# Patient Record
Sex: FEMALE | Race: WHITE | NOT HISPANIC OR LATINO
[De-identification: names, ages, dates, MRNs, and addresses within clinical notes are randomized per-mention and may not be internally consistent; named-entity substitution may affect disease eponyms.]

---

## 2017-10-23 ENCOUNTER — RX RENEWAL (OUTPATIENT)
Age: 57
End: 2017-10-23

## 2017-10-27 ENCOUNTER — APPOINTMENT (OUTPATIENT)
Dept: INTERNAL MEDICINE | Facility: CLINIC | Age: 57
End: 2017-10-27
Payer: COMMERCIAL

## 2017-10-27 VITALS
WEIGHT: 143 LBS | TEMPERATURE: 98.6 F | DIASTOLIC BLOOD PRESSURE: 80 MMHG | BODY MASS INDEX: 26.31 KG/M2 | OXYGEN SATURATION: 98 % | HEART RATE: 84 BPM | HEIGHT: 62 IN | RESPIRATION RATE: 18 BRPM | SYSTOLIC BLOOD PRESSURE: 120 MMHG

## 2017-10-27 DIAGNOSIS — H81.10 BENIGN PAROXYSMAL VERTIGO, UNSPECIFIED EAR: ICD-10-CM

## 2017-10-27 PROCEDURE — 99214 OFFICE O/P EST MOD 30 MIN: CPT

## 2017-10-27 RX ORDER — ALBUTEROL SULFATE 90 UG/1
108 (90 BASE) AEROSOL, METERED RESPIRATORY (INHALATION)
Qty: 18 | Refills: 0 | Status: ACTIVE | COMMUNITY
Start: 2017-05-30

## 2017-10-27 RX ORDER — ALBUTEROL SULFATE 108 UG/1
108 (90 BASE) AEROSOL, METERED RESPIRATORY (INHALATION)
Qty: 1 | Refills: 3 | Status: ACTIVE | COMMUNITY
Start: 2017-10-27 | End: 1900-01-01

## 2017-10-30 ENCOUNTER — TRANSCRIPTION ENCOUNTER (OUTPATIENT)
Age: 57
End: 2017-10-30

## 2017-12-22 ENCOUNTER — RX RENEWAL (OUTPATIENT)
Age: 57
End: 2017-12-22

## 2017-12-27 ENCOUNTER — RX RENEWAL (OUTPATIENT)
Age: 57
End: 2017-12-27

## 2018-01-19 ENCOUNTER — MEDICATION RENEWAL (OUTPATIENT)
Age: 58
End: 2018-01-19

## 2018-01-20 ENCOUNTER — RX RENEWAL (OUTPATIENT)
Age: 58
End: 2018-01-20

## 2018-01-23 ENCOUNTER — RX RENEWAL (OUTPATIENT)
Age: 58
End: 2018-01-23

## 2018-02-21 ENCOUNTER — MEDICATION RENEWAL (OUTPATIENT)
Age: 58
End: 2018-02-21

## 2018-03-25 ENCOUNTER — RX RENEWAL (OUTPATIENT)
Age: 58
End: 2018-03-25

## 2018-03-27 ENCOUNTER — MEDICATION RENEWAL (OUTPATIENT)
Age: 58
End: 2018-03-27

## 2018-03-30 ENCOUNTER — RX RENEWAL (OUTPATIENT)
Age: 58
End: 2018-03-30

## 2018-04-28 ENCOUNTER — RX RENEWAL (OUTPATIENT)
Age: 58
End: 2018-04-28

## 2018-05-25 ENCOUNTER — RX RENEWAL (OUTPATIENT)
Age: 58
End: 2018-05-25

## 2018-06-07 ENCOUNTER — APPOINTMENT (OUTPATIENT)
Dept: INTERNAL MEDICINE | Facility: CLINIC | Age: 58
End: 2018-06-07
Payer: COMMERCIAL

## 2018-06-07 VITALS
HEART RATE: 79 BPM | HEIGHT: 62 IN | BODY MASS INDEX: 26.5 KG/M2 | RESPIRATION RATE: 16 BRPM | SYSTOLIC BLOOD PRESSURE: 130 MMHG | WEIGHT: 144 LBS | OXYGEN SATURATION: 98 % | TEMPERATURE: 99.1 F | DIASTOLIC BLOOD PRESSURE: 80 MMHG

## 2018-06-07 DIAGNOSIS — Z87.891 PERSONAL HISTORY OF NICOTINE DEPENDENCE: ICD-10-CM

## 2018-06-07 PROCEDURE — 99214 OFFICE O/P EST MOD 30 MIN: CPT

## 2018-06-07 NOTE — ASSESSMENT
[FreeTextEntry1] : 57 yr old presenys sick visit\par #1 Sinusitis- TX Z- waldo as directed , continue saline rinses, \par #2 Allergic Rhin-  Anti- histamine daily, encouraged Singulair\par #3 Asthma- Continue Advair as directed, encouraged MAYLIN via Neb or Inhaler to prevent  lung reactivity\par F/U 2 weeks if not totally resolved

## 2018-06-07 NOTE — HEALTH RISK ASSESSMENT
[No falls in past year] : Patient reported no falls in the past year [0] : 1) Little interest or pleasure doing things: Not at all (0) [1] : 2) Feeling down, depressed, or hopeless for several days (1) [] : No [[id number]] : 0

## 2018-06-07 NOTE — HISTORY OF PRESENT ILLNESS
[FreeTextEntry1] : Sick visit [de-identified] : 57 yr old presents for post nasal drip, sore throat, felt tired. Reports her mucus is dark green at this time. Pt has known Allergies/ Asthma is taking Advair daily as recommended. Pt not taking Singulair and or Anti- histamine daily as directed. No fever, no chills, cough , mainly in morning upon awakening.

## 2018-06-07 NOTE — PHYSICAL EXAM
[No Acute Distress] : no acute distress [Well Nourished] : well nourished [Well Developed] : well developed [Well-Appearing] : well-appearing [Normal Sclera/Conjunctiva] : normal sclera/conjunctiva [PERRL] : pupils equal round and reactive to light [EOMI] : extraocular movements intact [Normal Outer Ear/Nose] : the outer ears and nose were normal in appearance [Normal Oropharynx] : the oropharynx was normal [No JVD] : no jugular venous distention [Supple] : supple [No Lymphadenopathy] : no lymphadenopathy [Thyroid Normal, No Nodules] : the thyroid was normal and there were no nodules present [No Respiratory Distress] : no respiratory distress  [Clear to Auscultation] : lungs were clear to auscultation bilaterally [No Accessory Muscle Use] : no accessory muscle use [Normal Rate] : normal rate  [Regular Rhythm] : with a regular rhythm [Normal S1, S2] : normal S1 and S2 [No Murmur] : no murmur heard [Normal Posterior Cervical Nodes] : no posterior cervical lymphadenopathy [Normal Anterior Cervical Nodes] : no anterior cervical lymphadenopathy [No CVA Tenderness] : no CVA  tenderness [No Spinal Tenderness] : no spinal tenderness [No Joint Swelling] : no joint swelling [Grossly Normal Strength/Tone] : grossly normal strength/tone [No Rash] : no rash [Normal Gait] : normal gait [Coordination Grossly Intact] : coordination grossly intact [No Focal Deficits] : no focal deficits [Deep Tendon Reflexes (DTR)] : deep tendon reflexes were 2+ and symmetric [Normal Affect] : the affect was normal [Normal Insight/Judgement] : insight and judgment were intact [de-identified] : sounds congested nasally when speaks  [de-identified] : decrease in air exchange

## 2019-01-13 ENCOUNTER — RX RENEWAL (OUTPATIENT)
Age: 59
End: 2019-01-13

## 2019-03-27 ENCOUNTER — APPOINTMENT (OUTPATIENT)
Dept: INTERNAL MEDICINE | Facility: CLINIC | Age: 59
End: 2019-03-27
Payer: COMMERCIAL

## 2019-03-27 VITALS
HEIGHT: 62 IN | SYSTOLIC BLOOD PRESSURE: 140 MMHG | OXYGEN SATURATION: 98 % | RESPIRATION RATE: 16 BRPM | TEMPERATURE: 98.1 F | DIASTOLIC BLOOD PRESSURE: 78 MMHG | WEIGHT: 148 LBS | BODY MASS INDEX: 27.23 KG/M2 | HEART RATE: 80 BPM

## 2019-03-27 PROCEDURE — 99214 OFFICE O/P EST MOD 30 MIN: CPT

## 2019-03-28 NOTE — REVIEW OF SYSTEMS
[Wheezing] : wheezing [Cough] : cough [Muscle Pain] : muscle pain [Anxiety] : anxiety [Fever] : no fever [Chills] : no chills [Night Sweats] : no night sweats [Chest Pain] : no chest pain [Palpitations] : no palpitations [Shortness Of Breath] : no shortness of breath [Dyspnea on Exertion] : no dyspnea on exertion [Abdominal Pain] : no abdominal pain [Nausea] : no nausea [Diarrhea] : diarrhea [Vomiting] : no vomiting [Heartburn] : no heartburn [Dysuria] : no dysuria [Nocturia] : no nocturia [Frequency] : no frequency [Headache] : no headache [Dizziness] : no dizziness [Fainting] : no fainting [FreeTextEntry9] : right lateral chest

## 2019-03-28 NOTE — HEALTH RISK ASSESSMENT
[Intercurrent Urgi Care visits] : went to urgent care [Any fall with injury in past year] : Patient reported fall with injury in the past year [0] : 2) Feeling down, depressed, or hopeless: Not at all (0) [] : No [de-identified] : none [de-identified] : exercises  [de-identified] : Regular

## 2019-03-28 NOTE — PLAN
[FreeTextEntry1] : Continue Motrin as prescribed with intermittent extra strength Tylenol every 6 hrs for break through pain.\par Will start consider adding Lidoderm patch for further pain management. \par Advised to take Motrin with food and to alternate between Motrin and Tylenol administration as needed. \par Advised to increase Advair dosing to twice daily ( was doing once  daily only) Pt resistant to Prednisone ( Medrol Dose Gonzales ) which was suggested, reports side effects are bothersome to her \par Will renew ProAir inhaler for rescue dosing as needed\par F/U 2-3 days if symptoms do not improve and/ or worsen,  If difficulty breathing/ 911 \par F/U Allergist/ Pulmonology

## 2019-03-28 NOTE — HISTORY OF PRESENT ILLNESS
[FreeTextEntry1] : Chest symptoms / Sick  [de-identified] : 58 y.o woman presenting with complaints of difficulty breathing and productive cough since s/p fall on 3/12 and hearing a pop in her right lateral chest wall that occurred on 3/22. She reports that since then she has had increase use of her rescue inhaler. She was seen in urgent care for fall and secondary popping sound. X-Ray performed and were negative. She was prescribed Motrin 600 mg 4 x daily as needed and omeprazole 40 mg. She reports only taking  Motrin 3 times instead. She has not taken her rescue inhaler for the past two days but reports concerns for lung injury or infection from the fall. Patient has PCP/ Dr. Go \philipp \par \par

## 2019-03-28 NOTE — PHYSICAL EXAM
[No Acute Distress] : no acute distress [Normal Outer Ear/Nose] : the outer ears and nose were normal in appearance [Normal Oropharynx] : the oropharynx was normal [Normal TMs] : both tympanic membranes were normal [No Lymphadenopathy] : no lymphadenopathy [No Respiratory Distress] : no respiratory distress  [Clear to Auscultation] : lungs were clear to auscultation bilaterally [No Accessory Muscle Use] : no accessory muscle use [Normal Rate] : normal rate  [Regular Rhythm] : with a regular rhythm [Normal S1, S2] : normal S1 and S2 [Soft] : abdomen soft [Non Tender] : non-tender [No Focal Deficits] : no focal deficits [No Rash] : no rash [Normal Gait] : normal gait [de-identified] : Expiration is shortenned  [de-identified] : right lateral chest wall tenderness with light palpation. +Wearing support brace / Plastic to left lower leg/ ankle  [de-identified] : anxious

## 2019-03-29 ENCOUNTER — TRANSCRIPTION ENCOUNTER (OUTPATIENT)
Age: 59
End: 2019-03-29

## 2019-03-29 ENCOUNTER — MEDICATION RENEWAL (OUTPATIENT)
Age: 59
End: 2019-03-29

## 2019-10-07 ENCOUNTER — RX RENEWAL (OUTPATIENT)
Age: 59
End: 2019-10-07

## 2019-11-08 ENCOUNTER — MEDICATION RENEWAL (OUTPATIENT)
Age: 59
End: 2019-11-08

## 2019-11-08 ENCOUNTER — RX RENEWAL (OUTPATIENT)
Age: 59
End: 2019-11-08

## 2020-01-24 ENCOUNTER — APPOINTMENT (OUTPATIENT)
Dept: INTERNAL MEDICINE | Facility: CLINIC | Age: 60
End: 2020-01-24
Payer: COMMERCIAL

## 2020-01-24 VITALS
OXYGEN SATURATION: 98 % | BODY MASS INDEX: 27.6 KG/M2 | RESPIRATION RATE: 16 BRPM | WEIGHT: 150 LBS | SYSTOLIC BLOOD PRESSURE: 130 MMHG | HEART RATE: 82 BPM | DIASTOLIC BLOOD PRESSURE: 80 MMHG | HEIGHT: 62 IN | TEMPERATURE: 98.2 F

## 2020-01-24 PROCEDURE — 99213 OFFICE O/P EST LOW 20 MIN: CPT | Mod: 25

## 2020-01-24 PROCEDURE — G0009: CPT

## 2020-01-24 PROCEDURE — 90670 PCV13 VACCINE IM: CPT

## 2020-01-24 NOTE — HISTORY OF PRESENT ILLNESS
[FreeTextEntry1] : F/U  [de-identified] : 59 yr old presents was seen by ENT, started on Prednisone and Doxycycline  for Sinusitis , feels better overall. Continues to use Maintenance Inhaler and rescue Inhaler for Asthma/ Allergies. Upset about gaining weight. Asking about pneumonia Vaccine.

## 2020-01-24 NOTE — HEALTH RISK ASSESSMENT
[1 or 2 (0 pts)] : 1 or 2 (0 points) [Never (0 pts)] : Never (0 points) [No] : In the past 12 months have you used drugs other than those required for medical reasons? No [No falls in past year] : Patient reported no falls in the past year [0] : 2) Feeling down, depressed, or hopeless: Not at all (0) [] : No [de-identified] : ENT [de-identified] : none [Audit-CScore] : 0 [[id number]] : 0

## 2020-01-24 NOTE — HEALTH RISK ASSESSMENT
[1 or 2 (0 pts)] : 1 or 2 (0 points) [Never (0 pts)] : Never (0 points) [No] : In the past 12 months have you used drugs other than those required for medical reasons? No [No falls in past year] : Patient reported no falls in the past year [0] : 2) Feeling down, depressed, or hopeless: Not at all (0) [] : No [de-identified] : none [de-identified] : ENT [Audit-CScore] : 0 [[id number]] : 0

## 2020-01-24 NOTE — ASSESSMENT
[FreeTextEntry1] : 59 yr old presents F/U \par \par #1 Asthma- Fairly good control, continue all maintenance meds , rescue Inhaler if needed only, would like to go back on Advair in future if possible thinks it works better for her. \par \par #2 Allergic Rhinitis- Anti- histamione if needed only, nasal spray daily\par \par PCV #13 given today, F/U PCP ( need labs from PCP)

## 2020-01-24 NOTE — HISTORY OF PRESENT ILLNESS
[FreeTextEntry1] : F/U  [de-identified] : 59 yr old presents was seen by ENT, started on Prednisone and Doxycycline  for Sinusitis , feels better overall. Continues to use Maintenance Inhaler and rescue Inhaler for Asthma/ Allergies. Upset about gaining weight. Asking about pneumonia Vaccine.

## 2020-01-24 NOTE — PHYSICAL EXAM
[Well Nourished] : well nourished [No Acute Distress] : no acute distress [Well-Appearing] : well-appearing [Well Developed] : well developed [Normal Sclera/Conjunctiva] : normal sclera/conjunctiva [Normal Oropharynx] : the oropharynx was normal [Normal Outer Ear/Nose] : the outer ears and nose were normal in appearance [No JVD] : no jugular venous distention [Normal TMs] : both tympanic membranes were normal [No Lymphadenopathy] : no lymphadenopathy [No Respiratory Distress] : no respiratory distress  [No Accessory Muscle Use] : no accessory muscle use [Normal Rate] : normal rate  [Clear to Auscultation] : lungs were clear to auscultation bilaterally [Normal Anterior Cervical Nodes] : no anterior cervical lymphadenopathy [No CVA Tenderness] : no CVA  tenderness [No Rash] : no rash [Grossly Normal Strength/Tone] : grossly normal strength/tone [No Joint Swelling] : no joint swelling [Coordination Grossly Intact] : coordination grossly intact [No Skin Lesions] : no skin lesions [Speech Grossly Normal] : speech grossly normal [Memory Grossly Normal] : memory grossly normal [Normal Affect] : the affect was normal [Alert and Oriented x3] : oriented to person, place, and time [Normal Mood] : the mood was normal [de-identified] : Horace cerumen ears  [Normal Insight/Judgement] : insight and judgment were intact

## 2020-01-24 NOTE — HISTORY OF PRESENT ILLNESS
[FreeTextEntry1] : F/U  [de-identified] : 59 yr old presents was seen by ENT, started on Prednisone and Doxycycline  for Sinusitis , feels better overall. Continues to use Maintenance Inhaler and rescue Inhaler for Asthma/ Allergies. Upset about gaining weight. Asking about pneumonia Vaccine.

## 2020-01-24 NOTE — PHYSICAL EXAM
[Well Nourished] : well nourished [No Acute Distress] : no acute distress [Well-Appearing] : well-appearing [Well Developed] : well developed [Normal Outer Ear/Nose] : the outer ears and nose were normal in appearance [Normal Sclera/Conjunctiva] : normal sclera/conjunctiva [Normal Oropharynx] : the oropharynx was normal [No JVD] : no jugular venous distention [Normal TMs] : both tympanic membranes were normal [No Lymphadenopathy] : no lymphadenopathy [No Respiratory Distress] : no respiratory distress  [No Accessory Muscle Use] : no accessory muscle use [Clear to Auscultation] : lungs were clear to auscultation bilaterally [Normal Rate] : normal rate  [No CVA Tenderness] : no CVA  tenderness [Normal Anterior Cervical Nodes] : no anterior cervical lymphadenopathy [Grossly Normal Strength/Tone] : grossly normal strength/tone [No Rash] : no rash [No Joint Swelling] : no joint swelling [Coordination Grossly Intact] : coordination grossly intact [No Skin Lesions] : no skin lesions [Memory Grossly Normal] : memory grossly normal [Speech Grossly Normal] : speech grossly normal [Normal Affect] : the affect was normal [Normal Mood] : the mood was normal [Alert and Oriented x3] : oriented to person, place, and time [de-identified] : Horace cerumen ears  [Normal Insight/Judgement] : insight and judgment were intact

## 2020-01-24 NOTE — PHYSICAL EXAM
[No Acute Distress] : no acute distress [Well Nourished] : well nourished [Well-Appearing] : well-appearing [Well Developed] : well developed [Normal Sclera/Conjunctiva] : normal sclera/conjunctiva [Normal Oropharynx] : the oropharynx was normal [Normal Outer Ear/Nose] : the outer ears and nose were normal in appearance [Normal TMs] : both tympanic membranes were normal [No JVD] : no jugular venous distention [No Lymphadenopathy] : no lymphadenopathy [No Respiratory Distress] : no respiratory distress  [No Accessory Muscle Use] : no accessory muscle use [Normal Rate] : normal rate  [Clear to Auscultation] : lungs were clear to auscultation bilaterally [No CVA Tenderness] : no CVA  tenderness [Normal Anterior Cervical Nodes] : no anterior cervical lymphadenopathy [No Rash] : no rash [Grossly Normal Strength/Tone] : grossly normal strength/tone [No Joint Swelling] : no joint swelling [Coordination Grossly Intact] : coordination grossly intact [No Skin Lesions] : no skin lesions [Speech Grossly Normal] : speech grossly normal [Normal Affect] : the affect was normal [Memory Grossly Normal] : memory grossly normal [Normal Mood] : the mood was normal [Alert and Oriented x3] : oriented to person, place, and time [de-identified] : Horace cerumen ears  [Normal Insight/Judgement] : insight and judgment were intact

## 2020-01-24 NOTE — HEALTH RISK ASSESSMENT
[1 or 2 (0 pts)] : 1 or 2 (0 points) [Never (0 pts)] : Never (0 points) [No] : In the past 12 months have you used drugs other than those required for medical reasons? No [No falls in past year] : Patient reported no falls in the past year [0] : 2) Feeling down, depressed, or hopeless: Not at all (0) [] : No [de-identified] : ENT [de-identified] : none [[id number]] : 0 [Audit-CScore] : 0

## 2020-02-17 ENCOUNTER — APPOINTMENT (OUTPATIENT)
Dept: INTERNAL MEDICINE | Facility: CLINIC | Age: 60
End: 2020-02-17
Payer: COMMERCIAL

## 2020-02-17 VITALS
BODY MASS INDEX: 27.79 KG/M2 | DIASTOLIC BLOOD PRESSURE: 80 MMHG | WEIGHT: 151 LBS | HEART RATE: 72 BPM | SYSTOLIC BLOOD PRESSURE: 120 MMHG | OXYGEN SATURATION: 98 % | HEIGHT: 62 IN | RESPIRATION RATE: 12 BRPM

## 2020-02-17 VITALS
BODY MASS INDEX: 27.79 KG/M2 | HEART RATE: 72 BPM | WEIGHT: 151 LBS | TEMPERATURE: 98.4 F | HEIGHT: 62 IN | OXYGEN SATURATION: 98 %

## 2020-02-17 DIAGNOSIS — C50.919 MALIGNANT NEOPLASM OF UNSPECIFIED SITE OF UNSPECIFIED FEMALE BREAST: ICD-10-CM

## 2020-02-17 DIAGNOSIS — Z87.09 PERSONAL HISTORY OF OTHER DISEASES OF THE RESPIRATORY SYSTEM: ICD-10-CM

## 2020-02-17 DIAGNOSIS — R07.81 PLEURODYNIA: ICD-10-CM

## 2020-02-17 DIAGNOSIS — Z23 ENCOUNTER FOR IMMUNIZATION: ICD-10-CM

## 2020-02-17 DIAGNOSIS — J45.901 UNSPECIFIED ASTHMA WITH (ACUTE) EXACERBATION: ICD-10-CM

## 2020-02-17 PROCEDURE — 99203 OFFICE O/P NEW LOW 30 MIN: CPT | Mod: 25

## 2020-02-17 PROCEDURE — 94010 BREATHING CAPACITY TEST: CPT

## 2020-02-17 RX ORDER — LIDOCAINE 5% 700 MG/1
5 PATCH TOPICAL
Qty: 30 | Refills: 1 | Status: DISCONTINUED | COMMUNITY
Start: 2019-03-27 | End: 2020-02-17

## 2020-02-17 RX ORDER — HYDROCHLOROTHIAZIDE 12.5 MG/1
12.5 CAPSULE ORAL DAILY
Qty: 30 | Refills: 0 | Status: DISCONTINUED | COMMUNITY
End: 2020-02-17

## 2020-02-17 RX ORDER — FLUTICASONE PROPIONATE AND SALMETEROL 50; 500 UG/1; UG/1
500-50 POWDER RESPIRATORY (INHALATION)
Qty: 1 | Refills: 3 | Status: DISCONTINUED | COMMUNITY
Start: 2018-01-20 | End: 2020-02-17

## 2020-02-17 RX ORDER — MECLIZINE HYDROCHLORIDE 25 MG/1
25 TABLET ORAL 3 TIMES DAILY
Qty: 45 | Refills: 1 | Status: DISCONTINUED | COMMUNITY
Start: 2017-10-27 | End: 2020-02-17

## 2020-02-17 NOTE — REASON FOR VISIT
[Asthma] : asthma [Initial Consultation] : an initial consultation for [Wheezing] : wheezing [Cough] : cough

## 2020-02-17 NOTE — SOCIAL HISTORY
[House] : [unfilled] lives in a house  [Dry] : dry [Radiator/Baseboard] : heating provided by radiator(s)/baseboard(s) [Cat] : cat [Dust Mite Covers] : does not have dust mite covers [Feather Comforter] : does not have a feather comforter [Bedroom] : not in the bedroom [Basement] : not in the basement [Feather Pillows] : does not have feather pillows [Living Area] : not in the living area

## 2020-02-17 NOTE — HISTORY OF PRESENT ILLNESS
[Dyspnea on Exertion] : dyspnea on exertion [Cough] : cough [0 x/month] : 0 x/month [> than 2 exac/6months] : > than 2 exac/6months [< or = 2 days/wk] : < than or = 2 days/week [Minor Limitation] : minor limitation [Chest Pain] : no chest pain [Sputum Production] : non productive cough [Shortness of Breath] : no shortness of breath [Wheezing] : no wheezing

## 2020-02-17 NOTE — PHYSICAL EXAM
[Well Nourished] : well nourished [Normal Rate and Effort] : normal respiratory rhythm and effort [Bilateral Audible Breath Sounds] : bilateral audible breath sounds [Normal S1, S2] : normal S1 and S2 [No murmur] : no murmur [Conjunctival Erythema] : no conjunctival erythema [Pharyngeal erythema] : no pharyngeal erythema [Boggy Nasal Turbinates] : no boggy and/or pale nasal turbinates [Suborbital Bogginess] : no suborbital bogginess (allergic shiners) [Exudate] : no exudate [Clear Rhinorrhea] : no clear rhinorrhea was seen [Posterior Pharyngeal Cobblestoning] : no posterior pharyngeal cobblestoning [Wheezing] : no wheezing was heard

## 2020-03-19 ENCOUNTER — APPOINTMENT (OUTPATIENT)
Dept: INTERNAL MEDICINE | Facility: CLINIC | Age: 60
End: 2020-03-19
Payer: COMMERCIAL

## 2020-03-19 VITALS
DIASTOLIC BLOOD PRESSURE: 70 MMHG | SYSTOLIC BLOOD PRESSURE: 130 MMHG | TEMPERATURE: 98.4 F | BODY MASS INDEX: 27.6 KG/M2 | HEART RATE: 98 BPM | HEIGHT: 62 IN | OXYGEN SATURATION: 98 % | WEIGHT: 150 LBS

## 2020-03-19 DIAGNOSIS — M94.0 CHONDROCOSTAL JUNCTION SYNDROME [TIETZE]: ICD-10-CM

## 2020-03-19 PROCEDURE — 99213 OFFICE O/P EST LOW 20 MIN: CPT

## 2020-03-19 NOTE — HISTORY OF PRESENT ILLNESS
[FreeTextEntry8] : The pt presents with C/O some exacerbation of her asthma symptoms\par Has minimal phlem\par Temp are not elevated\par Is using her ICS/LABA once daily\par Is exposed to the general public at her work\par

## 2020-03-19 NOTE — PHYSICAL EXAM
[Well Nourished] : well nourished [Normal Oropharynx] : the oropharynx was normal [Supple] : supple [Normal Rate] : normal rate  [Regular Rhythm] : with a regular rhythm [Normal S1, S2] : normal S1 and S2 [No Lymphadenopathy] : no lymphadenopathy [Clear to Auscultation] : lungs were clear to auscultation bilaterally

## 2020-03-19 NOTE — HEALTH RISK ASSESSMENT
[No] : In the past 12 months have you used drugs other than those required for medical reasons? No [No falls in past year] : Patient reported no falls in the past year [0] : 2) Feeling down, depressed, or hopeless: Not at all (0) [] : No [de-identified] : ENT [Audit-CScore] : 0 [[id number]] : 0

## 2020-03-30 ENCOUNTER — TRANSCRIPTION ENCOUNTER (OUTPATIENT)
Age: 60
End: 2020-03-30

## 2020-06-10 ENCOUNTER — TRANSCRIPTION ENCOUNTER (OUTPATIENT)
Age: 60
End: 2020-06-10

## 2020-07-16 ENCOUNTER — TRANSCRIPTION ENCOUNTER (OUTPATIENT)
Age: 60
End: 2020-07-16

## 2020-07-29 ENCOUNTER — APPOINTMENT (OUTPATIENT)
Dept: INTERNAL MEDICINE | Facility: CLINIC | Age: 60
End: 2020-07-29

## 2020-07-29 VITALS
TEMPERATURE: 97.6 F | WEIGHT: 151 LBS | BODY MASS INDEX: 27.79 KG/M2 | OXYGEN SATURATION: 98 % | HEIGHT: 62 IN | HEART RATE: 85 BPM

## 2020-08-04 ENCOUNTER — APPOINTMENT (OUTPATIENT)
Dept: INTERNAL MEDICINE | Facility: CLINIC | Age: 60
End: 2020-08-04
Payer: COMMERCIAL

## 2020-08-04 VITALS
SYSTOLIC BLOOD PRESSURE: 132 MMHG | OXYGEN SATURATION: 98 % | HEART RATE: 75 BPM | RESPIRATION RATE: 16 BRPM | BODY MASS INDEX: 27.6 KG/M2 | DIASTOLIC BLOOD PRESSURE: 80 MMHG | TEMPERATURE: 98.1 F | WEIGHT: 150 LBS | HEIGHT: 62 IN

## 2020-08-04 PROCEDURE — 36415 COLL VENOUS BLD VENIPUNCTURE: CPT

## 2020-08-04 PROCEDURE — 99213 OFFICE O/P EST LOW 20 MIN: CPT | Mod: 25

## 2020-08-05 NOTE — HEALTH RISK ASSESSMENT
[No] : In the past 12 months have you used drugs other than those required for medical reasons? No [No falls in past year] : Patient reported no falls in the past year [0] : 2) Feeling down, depressed, or hopeless: Not at all (0) [] : No [de-identified] : ENT -Dr. Lees [de-identified] : none [Audit-CScore] : 0 [de-identified] : Walking, Stationary bike [de-identified] : Normal diet. [[id number]] : 0

## 2020-08-05 NOTE — ASSESSMENT
[FreeTextEntry1] : 59 yr old new PCP\par \par #1 Asthma- Controlled ? F/U Pulmonary for Consult, continue maintenance Inhaler\par \par #2 Allergic Rhinitis- Keep under control with all meds, N/S to Nares Bilaterally \par \par #3 Anxiety- Renew Klonopin , F/U Counseling ( Cant tolerate SSRI's according to Patient) \par \par #4 HTN- Controlled, low salt diet, meds daily\par \par Labs done in office by MA, will call with results\par \par F/U 3-4 months , needs CPE\par

## 2020-08-05 NOTE — PHYSICAL EXAM
[No Acute Distress] : no acute distress [Well-Appearing] : well-appearing [Well Nourished] : well nourished [Well Developed] : well developed [Normal Oropharynx] : the oropharynx was normal [Normal Sclera/Conjunctiva] : normal sclera/conjunctiva [No JVD] : no jugular venous distention [Normal Outer Ear/Nose] : the outer ears and nose were normal in appearance [No Respiratory Distress] : no respiratory distress  [No Accessory Muscle Use] : no accessory muscle use [No Lymphadenopathy] : no lymphadenopathy [Normal Rate] : normal rate  [Clear to Auscultation] : lungs were clear to auscultation bilaterally [No Edema] : there was no peripheral edema [No Rash] : no rash [No CVA Tenderness] : no CVA  tenderness [No Joint Swelling] : no joint swelling [Normal Anterior Cervical Nodes] : no anterior cervical lymphadenopathy [No Skin Lesions] : no skin lesions [Normal Gait] : normal gait [Coordination Grossly Intact] : coordination grossly intact [Alert and Oriented x3] : oriented to person, place, and time [Normal Affect] : the affect was normal [Speech Grossly Normal] : speech grossly normal [Memory Grossly Normal] : memory grossly normal [Normal Insight/Judgement] : insight and judgment were intact [de-identified] : mildly overweight  [Normal Mood] : the mood was normal [de-identified] : slight decrease in expiration heard  [de-identified] : Nares very narrow  [de-identified] : mildly anxious

## 2020-08-05 NOTE — HISTORY OF PRESENT ILLNESS
[FreeTextEntry1] : Seeking new PCP [de-identified] : 59 yr old presents today, antelmo PCP/ Dr. Go recently retired, C/O nose burning, went to ENT no infection, uses nasal spray and lubrication.  Pt seen by Allergist multiple times has chronic Allergies/ Asthma with frequent exaccerbations, +Anxiety Chronic, Therapy many years, takes Klonopin daily many years with no increase in dosage. +++MULTIPLE ALLERGIES TO MEDICATIONS++++

## 2020-08-10 LAB
24R-OH-CALCIDIOL SERPL-MCNC: 53.8 PG/ML
ALBUMIN SERPL ELPH-MCNC: 4.7 G/DL
ALP BLD-CCNC: 81 U/L
ALT SERPL-CCNC: 30 U/L
ANION GAP SERPL CALC-SCNC: 18 MMOL/L
AST SERPL-CCNC: 25 U/L
BASOPHILS # BLD AUTO: 0.05 K/UL
BASOPHILS NFR BLD AUTO: 0.7 %
BILIRUB SERPL-MCNC: 0.2 MG/DL
BUN SERPL-MCNC: 14 MG/DL
CALCIUM SERPL-MCNC: 9.8 MG/DL
CHLORIDE SERPL-SCNC: 101 MMOL/L
CHOLEST SERPL-MCNC: 188 MG/DL
CHOLEST/HDLC SERPL: 2.7 RATIO
CO2 SERPL-SCNC: 20 MMOL/L
CREAT SERPL-MCNC: 0.64 MG/DL
EOSINOPHIL # BLD AUTO: 0.14 K/UL
EOSINOPHIL NFR BLD AUTO: 2 %
ESTIMATED AVERAGE GLUCOSE: 105 MG/DL
GLUCOSE SERPL-MCNC: 104 MG/DL
HBA1C MFR BLD HPLC: 5.3 %
HCT VFR BLD CALC: 44.2 %
HDLC SERPL-MCNC: 69 MG/DL
HGB BLD-MCNC: 13.9 G/DL
IMM GRANULOCYTES NFR BLD AUTO: 0.1 %
LDLC SERPL CALC-MCNC: 106 MG/DL
LYMPHOCYTES # BLD AUTO: 1.95 K/UL
LYMPHOCYTES NFR BLD AUTO: 27.6 %
MAN DIFF?: NORMAL
MCHC RBC-ENTMCNC: 29.1 PG
MCHC RBC-ENTMCNC: 31.4 GM/DL
MCV RBC AUTO: 92.7 FL
MONOCYTES # BLD AUTO: 0.61 K/UL
MONOCYTES NFR BLD AUTO: 8.6 %
NEUTROPHILS # BLD AUTO: 4.3 K/UL
NEUTROPHILS NFR BLD AUTO: 61 %
PLATELET # BLD AUTO: 228 K/UL
POTASSIUM SERPL-SCNC: 3.9 MMOL/L
PROT SERPL-MCNC: 6.8 G/DL
RBC # BLD: 4.77 M/UL
RBC # FLD: 12.5 %
SODIUM SERPL-SCNC: 139 MMOL/L
TRIGL SERPL-MCNC: 62 MG/DL
TSH SERPL-ACNC: 2.14 UIU/ML
WBC # FLD AUTO: 7.06 K/UL

## 2020-08-20 ENCOUNTER — APPOINTMENT (OUTPATIENT)
Dept: INTERNAL MEDICINE | Facility: CLINIC | Age: 60
End: 2020-08-20
Payer: COMMERCIAL

## 2020-08-20 VITALS
HEART RATE: 90 BPM | BODY MASS INDEX: 27.97 KG/M2 | HEIGHT: 62 IN | DIASTOLIC BLOOD PRESSURE: 70 MMHG | SYSTOLIC BLOOD PRESSURE: 130 MMHG | WEIGHT: 152 LBS | OXYGEN SATURATION: 98 % | TEMPERATURE: 97.9 F

## 2020-08-20 PROCEDURE — 99213 OFFICE O/P EST LOW 20 MIN: CPT

## 2020-08-20 RX ORDER — TRIAMCINOLONE ACETONIDE 55 UG/1
55 SPRAY, METERED NASAL
Refills: 0 | Status: ACTIVE | COMMUNITY

## 2020-08-20 NOTE — PHYSICAL EXAM
[Well Nourished] : well nourished [Normal Pupil & Iris Size/Symmetry] : normal pupil and iris size and symmetry [Normal TMs] : both tympanic membranes were normal [Boggy Nasal Turbinates] : boggy and/or pale nasal turbinates [Pharyngeal erythema] : pharyngeal erythema [Posterior Pharyngeal Cobblestoning] : no posterior pharyngeal cobblestoning [Clear Rhinorrhea] : no clear rhinorrhea was seen [Normal Rate and Effort] : normal respiratory rhythm and effort [Bilateral Audible Breath Sounds] : bilateral audible breath sounds [Wheezing] : no wheezing was heard [Normal Rate] : heart rate was normal  [Normal S1, S2] : normal S1 and S2 [No murmur] : no murmur

## 2020-08-20 NOTE — REVIEW OF SYSTEMS
[Nasal Congestion] : nasal congestion [Hoarseness] : hoarseness [Post Nasal Drip] : post nasal drip [Cough] : cough [Congested In The Chest] : feeling ~L congested in the chest [Wheezing] : wheezing [Nl] : Integumentary [Nosebleeds] : no epistaxis [Rhinorrhea] : no rhinorrhea [Snoring] : no snoring [Nasal Dryness] : no dryness of the nose [Mouth Sores] : no mouth sores [Nasal Itching] : no nasal itching [Oral Thrush] : no oral thrush [Bad Breath] : no bad breath [Throat Itching] : no throat itching [Sore Throat] : no sore throat [Sneezing] : no sneezing [SOB with Exertion] : no dyspnea on exertion [Nocturnal Awakening] : no nocturnal awakening with shortness of breath [Wheezing Worsens With Exercise] : wheezing does not worsen with exercise [Wheezing Worse During Cold Weather] : wheezing not ~L worse during cold weather

## 2020-08-20 NOTE — HISTORY OF PRESENT ILLNESS
[de-identified] : The pt feels that she has a slight cough and nasal congestion/cough\par Has popping of her ears

## 2020-08-27 ENCOUNTER — TRANSCRIPTION ENCOUNTER (OUTPATIENT)
Age: 60
End: 2020-08-27

## 2020-08-28 NOTE — HEALTH RISK ASSESSMENT
[No] : In the past 12 months have you used drugs other than those required for medical reasons? No [No falls in past year] : Patient reported no falls in the past year [0] : 2) Feeling down, depressed, or hopeless: Not at all (0) [] : No [de-identified] : ENT  [Audit-CScore] : 0 [de-identified] : ride bike  [de-identified] : normal  [[id number]] : 0

## 2020-09-02 ENCOUNTER — TRANSCRIPTION ENCOUNTER (OUTPATIENT)
Age: 60
End: 2020-09-02

## 2020-10-22 ENCOUNTER — NON-APPOINTMENT (OUTPATIENT)
Age: 60
End: 2020-10-22

## 2020-10-22 ENCOUNTER — TRANSCRIPTION ENCOUNTER (OUTPATIENT)
Age: 60
End: 2020-10-22

## 2020-11-01 ENCOUNTER — TRANSCRIPTION ENCOUNTER (OUTPATIENT)
Age: 60
End: 2020-11-01

## 2020-11-30 ENCOUNTER — TRANSCRIPTION ENCOUNTER (OUTPATIENT)
Age: 60
End: 2020-11-30

## 2020-12-01 ENCOUNTER — TRANSCRIPTION ENCOUNTER (OUTPATIENT)
Age: 60
End: 2020-12-01

## 2020-12-29 ENCOUNTER — TRANSCRIPTION ENCOUNTER (OUTPATIENT)
Age: 60
End: 2020-12-29

## 2021-01-27 ENCOUNTER — RX RENEWAL (OUTPATIENT)
Age: 61
End: 2021-01-27

## 2021-02-10 ENCOUNTER — APPOINTMENT (OUTPATIENT)
Dept: INTERNAL MEDICINE | Facility: CLINIC | Age: 61
End: 2021-02-10
Payer: COMMERCIAL

## 2021-02-10 VITALS
SYSTOLIC BLOOD PRESSURE: 126 MMHG | RESPIRATION RATE: 16 BRPM | TEMPERATURE: 97.2 F | WEIGHT: 150 LBS | HEIGHT: 62 IN | DIASTOLIC BLOOD PRESSURE: 80 MMHG | OXYGEN SATURATION: 98 % | HEART RATE: 77 BPM | BODY MASS INDEX: 27.6 KG/M2

## 2021-02-10 PROCEDURE — 99072 ADDL SUPL MATRL&STAF TM PHE: CPT

## 2021-02-10 PROCEDURE — 99396 PREV VISIT EST AGE 40-64: CPT | Mod: 25

## 2021-02-13 LAB
BASOPHILS # BLD AUTO: 0.05 K/UL
BASOPHILS NFR BLD AUTO: 0.6 %
EOSINOPHIL # BLD AUTO: 0.18 K/UL
EOSINOPHIL NFR BLD AUTO: 2 %
HCT VFR BLD CALC: 43.7 %
HGB BLD-MCNC: 14 G/DL
IMM GRANULOCYTES NFR BLD AUTO: 0.2 %
LYMPHOCYTES # BLD AUTO: 2.43 K/UL
LYMPHOCYTES NFR BLD AUTO: 27.5 %
MAN DIFF?: NORMAL
MCHC RBC-ENTMCNC: 30 PG
MCHC RBC-ENTMCNC: 32 GM/DL
MCV RBC AUTO: 93.6 FL
MONOCYTES # BLD AUTO: 0.86 K/UL
MONOCYTES NFR BLD AUTO: 9.7 %
NEUTROPHILS # BLD AUTO: 5.3 K/UL
NEUTROPHILS NFR BLD AUTO: 60 %
PLATELET # BLD AUTO: 215 K/UL
RBC # BLD: 4.67 M/UL
RBC # FLD: 12.9 %
WBC # FLD AUTO: 8.84 K/UL

## 2021-02-13 NOTE — PHYSICAL EXAM
[No Acute Distress] : no acute distress [Well Nourished] : well nourished [Well Developed] : well developed [Well-Appearing] : well-appearing [Normal Sclera/Conjunctiva] : normal sclera/conjunctiva [PERRL] : pupils equal round and reactive to light [EOMI] : extraocular movements intact [Normal Oropharynx] : the oropharynx was normal [Normal Outer Ear/Nose] : the outer ears and nose were normal in appearance [No JVD] : no jugular venous distention [No Lymphadenopathy] : no lymphadenopathy [Supple] : supple [Thyroid Normal, No Nodules] : the thyroid was normal and there were no nodules present [No Respiratory Distress] : no respiratory distress  [No Accessory Muscle Use] : no accessory muscle use [Clear to Auscultation] : lungs were clear to auscultation bilaterally [Regular Rhythm] : with a regular rhythm [Normal Rate] : normal rate  [Normal S1, S2] : normal S1 and S2 [No Murmur] : no murmur heard [No Carotid Bruits] : no carotid bruits [No Varicosities] : no varicosities [No Abdominal Bruit] : a ~M bruit was not heard ~T in the abdomen [Pedal Pulses Present] : the pedal pulses are present [No Edema] : there was no peripheral edema [No Palpable Aorta] : no palpable aorta [Normal Appearance] : normal in appearance [No Extremity Clubbing/Cyanosis] : no extremity clubbing/cyanosis [No Nipple Discharge] : no nipple discharge [No Axillary Lymphadenopathy] : no axillary lymphadenopathy [Non Tender] : non-tender [Soft] : abdomen soft [Non-distended] : non-distended [No Masses] : no abdominal mass palpated [No HSM] : no HSM [Normal Bowel Sounds] : normal bowel sounds [Normal Posterior Cervical Nodes] : no posterior cervical lymphadenopathy [Normal Anterior Cervical Nodes] : no anterior cervical lymphadenopathy [No CVA Tenderness] : no CVA  tenderness [No Spinal Tenderness] : no spinal tenderness [No Joint Swelling] : no joint swelling [Grossly Normal Strength/Tone] : grossly normal strength/tone [No Rash] : no rash [Coordination Grossly Intact] : coordination grossly intact [No Focal Deficits] : no focal deficits [Normal Gait] : normal gait [Deep Tendon Reflexes (DTR)] : deep tendon reflexes were 2+ and symmetric [Normal Affect] : the affect was normal [Normal Insight/Judgement] : insight and judgment were intact [Kyphosis] : no kyphosis [de-identified] : left scar S/P Lumpectomy / +Breast CA  in past

## 2021-02-13 NOTE — HEALTH RISK ASSESSMENT
[Good] : ~his/her~  mood as  good [Intercurrent Urgi Care visits] : went to urgent care [No] : In the past 12 months have you used drugs other than those required for medical reasons? No [No falls in past year] : Patient reported no falls in the past year [0] : 1) Little interest or pleasure doing things: Not at all (0) [1] : 2) Feeling down, depressed, or hopeless for several days (1) [Patient reported mammogram was normal] : Patient reported mammogram was normal [HIV Test offered] : HIV Test offered [Hepatitis C test offered] : Hepatitis C test offered [# of Members in Household ___] :  household currently consist of [unfilled] member(s) [Employed] : employed [] :  [Feels Safe at Home] : Feels safe at home [Independent] : managing finances [Reports changes in vision] : Reports changes in vision [Reports changes in dental health] : Reports changes in dental health [Smoke Detector] : smoke detector [Carbon Monoxide Detector] : carbon monoxide detector [Seat Belt] :  uses seat belt [Sunscreen] : uses sunscreen [None] : None [College] : College [Significant Other] : lives with significant other [Fully functional (bathing, dressing, toileting, transferring, walking, feeding)] : Fully functional (bathing, dressing, toileting, transferring, walking, feeding) [Fully functional (using the telephone, shopping, preparing meals, housekeeping, doing laundry, using] : Fully functional and needs no help or supervision to perform IADLs (using the telephone, shopping, preparing meals, housekeeping, doing laundry, using transportation, managing medications and managing finances) [] : No [de-identified] : [facility] [de-identified] : ENT-Dr. Lees [de-identified] : Walking [Audit-CScore] : 0 [de-identified] : Regular [[id number]] : 1 [Language] : denies difficulty with language [Change in mental status noted] : No change in mental status noted [Behavior] : denies difficulty with behavior [Learning/Retaining New Information] : denies difficulty learning/retaining new information [Handling Complex Tasks] : denies difficulty handling complex tasks [Reasoning] : denies difficulty with reasoning [Spatial Ability and Orientation] : denies difficulty with spatial ability and orientation [Sexually Active] : not sexually active [High Risk Behavior] : no high risk behavior [Reports changes in hearing] : Reports no changes in hearing [Guns at Home] : no guns at home [Travel to Developing Areas] : does not  travel to developing areas [TB Exposure] : is not being exposed to tuberculosis [Caregiver Concerns] : does not have caregiver concerns [MammogramDate] : 01/18 [ColonoscopyComments] : needs updated

## 2021-02-13 NOTE — COUNSELING
[Behavioral health counseling provided] : Behavioral health counseling provided [Sleep ___ hours/day] : Sleep [unfilled] hours/day [Engage in a relaxing activity] : Engage in a relaxing activity [Plan in advance] : Plan in advance [None] : None [de-identified] : stress reduction, relaxation

## 2021-02-13 NOTE — ASSESSMENT
[FreeTextEntry1] : 60 yr old presents CPE\par \par #1 Asthma- Controlled, Continue Inhalers daily, F/U Pulmonary \par \par #2 HTN- Controlled, low salt diet, med daily\par \par #3 Anxiety- Controlled, continue meds daily, F/U Counseling weekly \par \par labs done in office by MA / Health Screenings discussed\par Fit test ordered/ Needs updated Colonoscopy \par \par Updated mammo/ US

## 2021-02-14 ENCOUNTER — RX RENEWAL (OUTPATIENT)
Age: 61
End: 2021-02-14

## 2021-02-15 ENCOUNTER — TRANSCRIPTION ENCOUNTER (OUTPATIENT)
Age: 61
End: 2021-02-15

## 2021-02-15 LAB
25(OH)D3 SERPL-MCNC: 13.3 NG/ML
ABO + RH PNL BLD: NORMAL
ALBUMIN SERPL ELPH-MCNC: 4.5 G/DL
ALP BLD-CCNC: 87 U/L
ALT SERPL-CCNC: 35 U/L
ANION GAP SERPL CALC-SCNC: 14 MMOL/L
AST SERPL-CCNC: 23 U/L
BILIRUB SERPL-MCNC: 0.3 MG/DL
BUN SERPL-MCNC: 17 MG/DL
CALCIUM SERPL-MCNC: 9.8 MG/DL
CHLORIDE SERPL-SCNC: 104 MMOL/L
CHOLEST SERPL-MCNC: 207 MG/DL
CO2 SERPL-SCNC: 23 MMOL/L
CREAT SERPL-MCNC: 0.81 MG/DL
ESTIMATED AVERAGE GLUCOSE: 114 MG/DL
GLUCOSE SERPL-MCNC: 85 MG/DL
HBA1C MFR BLD HPLC: 5.6 %
HDLC SERPL-MCNC: 70 MG/DL
LDLC SERPL CALC-MCNC: 125 MG/DL
NONHDLC SERPL-MCNC: 136 MG/DL
POTASSIUM SERPL-SCNC: 3.9 MMOL/L
PROT SERPL-MCNC: 6.9 G/DL
SODIUM SERPL-SCNC: 141 MMOL/L
TRIGL SERPL-MCNC: 58 MG/DL
TSH SERPL-ACNC: 2.54 UIU/ML

## 2021-02-16 ENCOUNTER — TRANSCRIPTION ENCOUNTER (OUTPATIENT)
Age: 61
End: 2021-02-16

## 2021-02-17 ENCOUNTER — TRANSCRIPTION ENCOUNTER (OUTPATIENT)
Age: 61
End: 2021-02-17

## 2021-02-28 ENCOUNTER — TRANSCRIPTION ENCOUNTER (OUTPATIENT)
Age: 61
End: 2021-02-28

## 2021-03-25 RX ORDER — CLONAZEPAM 0.5 MG/1
0.5 TABLET ORAL
Qty: 90 | Refills: 0 | Status: DISCONTINUED | COMMUNITY
Start: 2017-06-02 | End: 2021-03-25

## 2021-03-27 ENCOUNTER — TRANSCRIPTION ENCOUNTER (OUTPATIENT)
Age: 61
End: 2021-03-27

## 2021-04-09 ENCOUNTER — TRANSCRIPTION ENCOUNTER (OUTPATIENT)
Age: 61
End: 2021-04-09

## 2021-04-10 ENCOUNTER — TRANSCRIPTION ENCOUNTER (OUTPATIENT)
Age: 61
End: 2021-04-10

## 2021-04-17 ENCOUNTER — TRANSCRIPTION ENCOUNTER (OUTPATIENT)
Age: 61
End: 2021-04-17

## 2021-04-20 ENCOUNTER — TRANSCRIPTION ENCOUNTER (OUTPATIENT)
Age: 61
End: 2021-04-20

## 2021-04-27 ENCOUNTER — RX RENEWAL (OUTPATIENT)
Age: 61
End: 2021-04-27

## 2021-05-18 ENCOUNTER — TRANSCRIPTION ENCOUNTER (OUTPATIENT)
Age: 61
End: 2021-05-18

## 2021-05-18 ENCOUNTER — RX RENEWAL (OUTPATIENT)
Age: 61
End: 2021-05-18

## 2021-06-02 ENCOUNTER — TRANSCRIPTION ENCOUNTER (OUTPATIENT)
Age: 61
End: 2021-06-02

## 2021-07-14 ENCOUNTER — TRANSCRIPTION ENCOUNTER (OUTPATIENT)
Age: 61
End: 2021-07-14

## 2021-08-13 ENCOUNTER — RX RENEWAL (OUTPATIENT)
Age: 61
End: 2021-08-13

## 2021-09-11 ENCOUNTER — APPOINTMENT (OUTPATIENT)
Dept: INTERNAL MEDICINE | Facility: CLINIC | Age: 61
End: 2021-09-11
Payer: COMMERCIAL

## 2021-09-11 VITALS
WEIGHT: 153 LBS | HEIGHT: 62 IN | OXYGEN SATURATION: 98 % | TEMPERATURE: 97 F | SYSTOLIC BLOOD PRESSURE: 151 MMHG | HEART RATE: 59 BPM | DIASTOLIC BLOOD PRESSURE: 79 MMHG | BODY MASS INDEX: 28.16 KG/M2 | RESPIRATION RATE: 16 BRPM

## 2021-09-11 PROCEDURE — 99213 OFFICE O/P EST LOW 20 MIN: CPT | Mod: 25

## 2021-09-11 NOTE — HEALTH RISK ASSESSMENT
[No] : In the past 12 months have you used drugs other than those required for medical reasons? No [No falls in past year] : Patient reported no falls in the past year [0] : 2) Feeling down, depressed, or hopeless: Not at all (0) [PHQ-2 Negative - No further assessment needed] : PHQ-2 Negative - No further assessment needed [PHQ-9 Negative - No further assessment needed] : PHQ-9 Negative - No further assessment needed [] : No [de-identified] : none [de-identified] : none [de-identified] : Walking  [[id number]] : 0 [de-identified] : Regular Diet

## 2021-09-11 NOTE — ASSESSMENT
[FreeTextEntry1] : 60 yr old presents CPE\par \par #1 Asthma- Controlled, Continue Inhalers daily, F/U Pulmonary \par \par #2 HTN- Controlled, low salt diet, med daily, weight loss recommended \par \par #3 Anxiety- Controlled, continue meds daily, F/U Counseling weekly \par Will renew Xanax, resistant to SSRI's \par \par labs done in office by MA / Encouraged Covid Vaccine\par in supervised setting/ Medical setting \par \par \par F/U 3-4 months,

## 2021-09-11 NOTE — PHYSICAL EXAM
[Normal] : affect was normal and insight and judgment were intact [de-identified] : +Whitish filmy discharge posterior Pharynx

## 2021-09-11 NOTE — HISTORY OF PRESENT ILLNESS
[FreeTextEntry1] : F/U HTN, Hyperlipidemia, Anxiety disorder, Vertigo/ meds renewed \par  [de-identified] : 60 yr old presents F/U conditions above, feels well overall, struggles with Allergies/ RAD. multiple other conditions, Pt is afraid to get Covid Vaccine due to multiple Allergies. Sees Allergist in past. Continues to work Night shift [employer] as .

## 2021-09-17 ENCOUNTER — RX RENEWAL (OUTPATIENT)
Age: 61
End: 2021-09-17

## 2021-09-17 LAB
ALBUMIN SERPL ELPH-MCNC: 4.2 G/DL
ALP BLD-CCNC: 89 U/L
ALT SERPL-CCNC: 18 U/L
ANION GAP SERPL CALC-SCNC: 12 MMOL/L
AST SERPL-CCNC: 19 U/L
BASOPHILS # BLD AUTO: 0.06 K/UL
BASOPHILS NFR BLD AUTO: 0.8 %
BILIRUB SERPL-MCNC: 0.5 MG/DL
BUN SERPL-MCNC: 16 MG/DL
CALCIUM SERPL-MCNC: 9.7 MG/DL
CHLORIDE SERPL-SCNC: 105 MMOL/L
CHOLEST SERPL-MCNC: 169 MG/DL
CO2 SERPL-SCNC: 25 MMOL/L
CREAT SERPL-MCNC: 0.65 MG/DL
EOSINOPHIL # BLD AUTO: 0.19 K/UL
EOSINOPHIL NFR BLD AUTO: 2.4 %
GLUCOSE SERPL-MCNC: 90 MG/DL
HCT VFR BLD CALC: 39.5 %
HDLC SERPL-MCNC: 64 MG/DL
HGB BLD-MCNC: 13.3 G/DL
IMM GRANULOCYTES NFR BLD AUTO: 0.1 %
LDLC SERPL CALC-MCNC: 90 MG/DL
LYMPHOCYTES # BLD AUTO: 2.86 K/UL
LYMPHOCYTES NFR BLD AUTO: 36.1 %
MAN DIFF?: NORMAL
MCHC RBC-ENTMCNC: 30.8 PG
MCHC RBC-ENTMCNC: 33.7 GM/DL
MCV RBC AUTO: 91.4 FL
MONOCYTES # BLD AUTO: 0.67 K/UL
MONOCYTES NFR BLD AUTO: 8.4 %
NEUTROPHILS # BLD AUTO: 4.14 K/UL
NEUTROPHILS NFR BLD AUTO: 52.2 %
NONHDLC SERPL-MCNC: 105 MG/DL
PLATELET # BLD AUTO: 214 K/UL
POTASSIUM SERPL-SCNC: 3.8 MMOL/L
PROT SERPL-MCNC: 6.5 G/DL
RBC # BLD: 4.32 M/UL
RBC # FLD: 12.3 %
SODIUM SERPL-SCNC: 142 MMOL/L
TRIGL SERPL-MCNC: 76 MG/DL
WBC # FLD AUTO: 7.93 K/UL

## 2021-10-22 ENCOUNTER — TRANSCRIPTION ENCOUNTER (OUTPATIENT)
Age: 61
End: 2021-10-22

## 2021-10-24 ENCOUNTER — TRANSCRIPTION ENCOUNTER (OUTPATIENT)
Age: 61
End: 2021-10-24

## 2021-10-25 ENCOUNTER — TRANSCRIPTION ENCOUNTER (OUTPATIENT)
Age: 61
End: 2021-10-25

## 2021-10-26 ENCOUNTER — APPOINTMENT (OUTPATIENT)
Dept: INTERNAL MEDICINE | Facility: CLINIC | Age: 61
End: 2021-10-26
Payer: COMMERCIAL

## 2021-10-26 DIAGNOSIS — Z71.85 ENCOUNTER FOR IMMUNIZATION SAFETY COUNSELING: ICD-10-CM

## 2021-10-26 DIAGNOSIS — Z12.39 ENCOUNTER FOR OTHER SCREENING FOR MALIGNANT NEOPLASM OF BREAST: ICD-10-CM

## 2021-10-26 DIAGNOSIS — Z12.4 ENCOUNTER FOR SCREENING FOR MALIGNANT NEOPLASM OF CERVIX: ICD-10-CM

## 2021-10-26 DIAGNOSIS — Z01.83 ENCOUNTER FOR BLOOD TYPING: ICD-10-CM

## 2021-10-26 DIAGNOSIS — Z13.820 ENCOUNTER FOR SCREENING FOR OSTEOPOROSIS: ICD-10-CM

## 2021-10-26 DIAGNOSIS — Z12.11 ENCOUNTER FOR SCREENING FOR MALIGNANT NEOPLASM OF COLON: ICD-10-CM

## 2021-10-26 PROCEDURE — 99213 OFFICE O/P EST LOW 20 MIN: CPT | Mod: 95

## 2021-10-26 NOTE — HISTORY OF PRESENT ILLNESS
[de-identified] : The pt has concerns re receiving COVID 19 vaccine \par She has had breast Ca and radiation Lt breast \par Also has a # of allergies to antibiotics \par Of note has received Pneumovax as well as Prevnar with no allergic reactions

## 2021-11-15 ENCOUNTER — RX RENEWAL (OUTPATIENT)
Age: 61
End: 2021-11-15

## 2021-12-09 ENCOUNTER — APPOINTMENT (OUTPATIENT)
Dept: INTERNAL MEDICINE | Facility: CLINIC | Age: 61
End: 2021-12-09
Payer: COMMERCIAL

## 2021-12-09 VITALS
WEIGHT: 156 LBS | DIASTOLIC BLOOD PRESSURE: 84 MMHG | OXYGEN SATURATION: 98 % | HEIGHT: 60 IN | BODY MASS INDEX: 30.63 KG/M2 | HEART RATE: 92 BPM | TEMPERATURE: 98 F | SYSTOLIC BLOOD PRESSURE: 145 MMHG

## 2021-12-09 DIAGNOSIS — R09.89 OTHER SPECIFIED SYMPTOMS AND SIGNS INVOLVING THE CIRCULATORY AND RESPIRATORY SYSTEMS: ICD-10-CM

## 2021-12-09 PROCEDURE — 99212 OFFICE O/P EST SF 10 MIN: CPT

## 2021-12-09 NOTE — HISTORY OF PRESENT ILLNESS
[FreeTextEntry8] : 59 yo F presenting with concerns of feeling sick for the past few days. She has a cough with congestion which makes her chest hurt. She feels tired and states her ears are popping. She denies fever.

## 2021-12-09 NOTE — PHYSICAL EXAM
[Ill-Appearing] : ill-appearing [No Lymphadenopathy] : no lymphadenopathy [No Respiratory Distress] : no respiratory distress  [No Accessory Muscle Use] : no accessory muscle use [Normal Rate] : normal rate  [Regular Rhythm] : with a regular rhythm [Normal S1, S2] : normal S1 and S2 [Normal Gait] : normal gait [Normal Affect] : the affect was normal [Alert and Oriented x3] : oriented to person, place, and time [de-identified] : audible wheeze when coughing but lungs clear to auscultation

## 2021-12-09 NOTE — HEALTH RISK ASSESSMENT
[No] : In the past 12 months have you used drugs other than those required for medical reasons? No [No falls in past year] : Patient reported no falls in the past year [0] : 1) Little interest or pleasure doing things: Not at all (0) [1] : 2) Feeling down, depressed, or hopeless for several days (1) [] : No [Audit-CScore] : 0 [de-identified] : Walking [de-identified] : Regular [[id number]] : 1

## 2021-12-14 ENCOUNTER — RX RENEWAL (OUTPATIENT)
Age: 61
End: 2021-12-14

## 2021-12-15 ENCOUNTER — TRANSCRIPTION ENCOUNTER (OUTPATIENT)
Age: 61
End: 2021-12-15

## 2022-01-14 ENCOUNTER — TRANSCRIPTION ENCOUNTER (OUTPATIENT)
Age: 62
End: 2022-01-14

## 2022-02-12 ENCOUNTER — RX RENEWAL (OUTPATIENT)
Age: 62
End: 2022-02-12

## 2022-02-14 ENCOUNTER — TRANSCRIPTION ENCOUNTER (OUTPATIENT)
Age: 62
End: 2022-02-14

## 2022-03-15 ENCOUNTER — RX RENEWAL (OUTPATIENT)
Age: 62
End: 2022-03-15

## 2022-05-16 ENCOUNTER — APPOINTMENT (OUTPATIENT)
Dept: INTERNAL MEDICINE | Facility: CLINIC | Age: 62
End: 2022-05-16
Payer: COMMERCIAL

## 2022-05-16 VITALS
HEIGHT: 60 IN | OXYGEN SATURATION: 97 % | SYSTOLIC BLOOD PRESSURE: 167 MMHG | TEMPERATURE: 98.3 F | DIASTOLIC BLOOD PRESSURE: 78 MMHG | HEART RATE: 89 BPM

## 2022-05-16 VITALS — SYSTOLIC BLOOD PRESSURE: 152 MMHG | DIASTOLIC BLOOD PRESSURE: 80 MMHG

## 2022-05-16 PROCEDURE — 99213 OFFICE O/P EST LOW 20 MIN: CPT | Mod: 25

## 2022-05-16 RX ORDER — AZITHROMYCIN 250 MG/1
250 TABLET, FILM COATED ORAL
Qty: 1 | Refills: 0 | Status: COMPLETED | COMMUNITY
Start: 2021-12-09 | End: 2022-05-16

## 2022-05-16 RX ORDER — METHYLPREDNISOLONE 4 MG/1
4 TABLET ORAL
Qty: 1 | Refills: 0 | Status: COMPLETED | COMMUNITY
Start: 2020-03-19 | End: 2022-05-16

## 2022-05-16 NOTE — ASSESSMENT
[FreeTextEntry1] : 61 year old female presents for follow-up anxiety and HTN.\par Labs drawn in-office\par Further management pending lab results\par See assessment for more details.\par

## 2022-05-16 NOTE — HISTORY OF PRESENT ILLNESS
[FreeTextEntry1] : 61 year old female presents fo f/u med renewal/labs [de-identified] : 61 year old female presents fo f/u med renewal/labs\par Renewal Hctz and klonipin for Duane Reade\par Patient reports increased anxiety related to home stressors. Reports recent anxiety attack.\par Not monitoring home BP.

## 2022-05-16 NOTE — COUNSELING
[None] : None [Good understanding] : Patient has a good understanding of lifestyle changes and steps needed to achieve self management goal [de-identified] : Low-sodium diet.

## 2022-05-16 NOTE — HEALTH RISK ASSESSMENT
[No] : In the past 12 months have you used drugs other than those required for medical reasons? No [No falls in past year] : Patient reported no falls in the past year [0] : 2) Feeling down, depressed, or hopeless: Not at all (0) [de-identified] : No [de-identified] : ENT Dr. Lees [de-identified] : No [de-identified] : Active  [de-identified] : Healthy [[id number]] : 0

## 2022-05-17 LAB
ALBUMIN SERPL ELPH-MCNC: 4.5 G/DL
ALP BLD-CCNC: 84 U/L
ALT SERPL-CCNC: 27 U/L
ANION GAP SERPL CALC-SCNC: 13 MMOL/L
AST SERPL-CCNC: 23 U/L
BASOPHILS # BLD AUTO: 0.07 K/UL
BASOPHILS NFR BLD AUTO: 0.9 %
BILIRUB SERPL-MCNC: 0.5 MG/DL
BUN SERPL-MCNC: 12 MG/DL
CALCIUM SERPL-MCNC: 9.8 MG/DL
CHLORIDE SERPL-SCNC: 106 MMOL/L
CHOLEST SERPL-MCNC: 193 MG/DL
CO2 SERPL-SCNC: 24 MMOL/L
CREAT SERPL-MCNC: 0.79 MG/DL
EGFR: 86 ML/MIN/1.73M2
EOSINOPHIL # BLD AUTO: 0.14 K/UL
EOSINOPHIL NFR BLD AUTO: 1.8 %
GLUCOSE SERPL-MCNC: 122 MG/DL
HCT VFR BLD CALC: 43.1 %
HDLC SERPL-MCNC: 66 MG/DL
HGB BLD-MCNC: 14 G/DL
IMM GRANULOCYTES NFR BLD AUTO: 0.3 %
LDLC SERPL CALC-MCNC: 108 MG/DL
LYMPHOCYTES # BLD AUTO: 2.41 K/UL
LYMPHOCYTES NFR BLD AUTO: 30.2 %
MAN DIFF?: NORMAL
MCHC RBC-ENTMCNC: 29.6 PG
MCHC RBC-ENTMCNC: 32.5 GM/DL
MCV RBC AUTO: 91.1 FL
MONOCYTES # BLD AUTO: 0.58 K/UL
MONOCYTES NFR BLD AUTO: 7.3 %
NEUTROPHILS # BLD AUTO: 4.77 K/UL
NEUTROPHILS NFR BLD AUTO: 59.5 %
NONHDLC SERPL-MCNC: 127 MG/DL
PLATELET # BLD AUTO: 212 K/UL
POTASSIUM SERPL-SCNC: 3.4 MMOL/L
PROT SERPL-MCNC: 6.4 G/DL
RBC # BLD: 4.73 M/UL
RBC # FLD: 12.5 %
SODIUM SERPL-SCNC: 143 MMOL/L
TRIGL SERPL-MCNC: 98 MG/DL
WBC # FLD AUTO: 7.99 K/UL

## 2022-08-19 ENCOUNTER — APPOINTMENT (OUTPATIENT)
Dept: INTERNAL MEDICINE | Facility: CLINIC | Age: 62
End: 2022-08-19

## 2022-08-29 ENCOUNTER — APPOINTMENT (OUTPATIENT)
Dept: INTERNAL MEDICINE | Facility: CLINIC | Age: 62
End: 2022-08-29

## 2022-08-29 VITALS — SYSTOLIC BLOOD PRESSURE: 150 MMHG | DIASTOLIC BLOOD PRESSURE: 80 MMHG

## 2022-08-29 VITALS
TEMPERATURE: 98.3 F | HEART RATE: 76 BPM | HEIGHT: 60 IN | OXYGEN SATURATION: 97 % | SYSTOLIC BLOOD PRESSURE: 167 MMHG | BODY MASS INDEX: 30.43 KG/M2 | DIASTOLIC BLOOD PRESSURE: 90 MMHG | WEIGHT: 155 LBS

## 2022-08-29 PROCEDURE — 36415 COLL VENOUS BLD VENIPUNCTURE: CPT

## 2022-08-29 PROCEDURE — 99213 OFFICE O/P EST LOW 20 MIN: CPT | Mod: 25

## 2022-08-30 ENCOUNTER — NON-APPOINTMENT (OUTPATIENT)
Age: 62
End: 2022-08-30

## 2022-08-30 LAB
ALBUMIN SERPL ELPH-MCNC: 4.4 G/DL
ALP BLD-CCNC: 89 U/L
ALT SERPL-CCNC: 18 U/L
ANION GAP SERPL CALC-SCNC: 17 MMOL/L
AST SERPL-CCNC: 20 U/L
BASOPHILS # BLD AUTO: 0.04 K/UL
BASOPHILS NFR BLD AUTO: 0.6 %
BILIRUB SERPL-MCNC: 0.5 MG/DL
BUN SERPL-MCNC: 14 MG/DL
CALCIUM SERPL-MCNC: 9.6 MG/DL
CHLORIDE SERPL-SCNC: 103 MMOL/L
CHOLEST SERPL-MCNC: 202 MG/DL
CO2 SERPL-SCNC: 22 MMOL/L
CREAT SERPL-MCNC: 0.72 MG/DL
EGFR: 96 ML/MIN/1.73M2
EOSINOPHIL # BLD AUTO: 0.12 K/UL
EOSINOPHIL NFR BLD AUTO: 1.7 %
ESTIMATED AVERAGE GLUCOSE: 114 MG/DL
GLUCOSE SERPL-MCNC: 113 MG/DL
HBA1C MFR BLD HPLC: 5.6 %
HCT VFR BLD CALC: 41 %
HDLC SERPL-MCNC: 65 MG/DL
HGB BLD-MCNC: 13.6 G/DL
IMM GRANULOCYTES NFR BLD AUTO: 0.3 %
LDLC SERPL CALC-MCNC: 118 MG/DL
LYMPHOCYTES # BLD AUTO: 2.07 K/UL
LYMPHOCYTES NFR BLD AUTO: 30 %
MAN DIFF?: NORMAL
MCHC RBC-ENTMCNC: 30 PG
MCHC RBC-ENTMCNC: 33.2 GM/DL
MCV RBC AUTO: 90.5 FL
MONOCYTES # BLD AUTO: 0.56 K/UL
MONOCYTES NFR BLD AUTO: 8.1 %
NEUTROPHILS # BLD AUTO: 4.09 K/UL
NEUTROPHILS NFR BLD AUTO: 59.3 %
NONHDLC SERPL-MCNC: 138 MG/DL
PLATELET # BLD AUTO: 222 K/UL
POTASSIUM SERPL-SCNC: 3.5 MMOL/L
PROT SERPL-MCNC: 6.5 G/DL
RBC # BLD: 4.53 M/UL
RBC # FLD: 13.1 %
SODIUM SERPL-SCNC: 142 MMOL/L
TRIGL SERPL-MCNC: 101 MG/DL
TSH SERPL-ACNC: 0.98 UIU/ML
WBC # FLD AUTO: 6.9 K/UL

## 2022-08-30 NOTE — HEALTH RISK ASSESSMENT
[Never] : Never [No] : In the past 12 months have you used drugs other than those required for medical reasons? No [No falls in past year] : Patient reported no falls in the past year [0] : 2) Feeling down, depressed, or hopeless: Not at all (0) [PHQ-2 Negative - No further assessment needed] : PHQ-2 Negative - No further assessment needed [de-identified] : Walking  [de-identified] : Regular Diet [[id number]] : 0

## 2022-08-30 NOTE — HISTORY OF PRESENT ILLNESS
[FreeTextEntry1] : 61 year old female presents for medication renewal. [de-identified] : 61 year old female presents for medication renewal and labwork.\par Patient requesting renewal meclizine for intermittent episodes vertigo.\par \par Home BP monitoring averaging 120s-130s/80s\par In-office BP elevated.\par Patient does not have machine available for validation today.

## 2022-08-30 NOTE — COUNSELING
[None] : None [Good understanding] : Patient has a good understanding of lifestyle changes and steps needed to achieve self management goal [de-identified] : Healthy diet.

## 2022-08-30 NOTE — ASSESSMENT
[FreeTextEntry1] : 61 year old female presents for follow-up and labwork.\par Labs drawn in-office\par Further management pending lab results\par RTC 3 weeks.\par Medication renewed.

## 2022-09-13 ENCOUNTER — APPOINTMENT (OUTPATIENT)
Dept: INTERNAL MEDICINE | Facility: CLINIC | Age: 62
End: 2022-09-13

## 2022-09-13 VITALS
RESPIRATION RATE: 17 BRPM | OXYGEN SATURATION: 99 % | HEART RATE: 78 BPM | TEMPERATURE: 98.1 F | BODY MASS INDEX: 30.43 KG/M2 | HEIGHT: 60 IN | SYSTOLIC BLOOD PRESSURE: 159 MMHG | WEIGHT: 155 LBS | DIASTOLIC BLOOD PRESSURE: 83 MMHG

## 2022-09-13 PROCEDURE — 99213 OFFICE O/P EST LOW 20 MIN: CPT

## 2022-11-11 ENCOUNTER — TRANSCRIPTION ENCOUNTER (OUTPATIENT)
Age: 62
End: 2022-11-11

## 2022-11-13 ENCOUNTER — RX RENEWAL (OUTPATIENT)
Age: 62
End: 2022-11-13

## 2022-12-21 ENCOUNTER — APPOINTMENT (OUTPATIENT)
Dept: INTERNAL MEDICINE | Facility: CLINIC | Age: 62
End: 2022-12-21

## 2022-12-21 VITALS
HEART RATE: 93 BPM | TEMPERATURE: 98 F | RESPIRATION RATE: 16 BRPM | SYSTOLIC BLOOD PRESSURE: 170 MMHG | DIASTOLIC BLOOD PRESSURE: 75 MMHG | HEIGHT: 60 IN | BODY MASS INDEX: 27.68 KG/M2 | OXYGEN SATURATION: 99 % | WEIGHT: 141 LBS

## 2022-12-21 DIAGNOSIS — Z12.11 ENCOUNTER FOR SCREENING FOR MALIGNANT NEOPLASM OF COLON: ICD-10-CM

## 2022-12-21 DIAGNOSIS — Z13.820 ENCOUNTER FOR SCREENING FOR OSTEOPOROSIS: ICD-10-CM

## 2022-12-21 DIAGNOSIS — R25.2 CRAMP AND SPASM: ICD-10-CM

## 2022-12-21 PROCEDURE — 99396 PREV VISIT EST AGE 40-64: CPT | Mod: 25

## 2022-12-21 RX ORDER — ALBUTEROL SULFATE 90 UG/1
108 (90 BASE) INHALANT RESPIRATORY (INHALATION)
Qty: 3 | Refills: 1 | Status: ACTIVE | COMMUNITY
Start: 2019-03-27 | End: 1900-01-01

## 2022-12-22 NOTE — HISTORY OF PRESENT ILLNESS
[FreeTextEntry1] : CPE/ declines Flu vaccine [de-identified] : 62 yr old presents for above, feels well overall. Struggles with her weight, recent weight gain noted, refuses to get on scale. Struggles with chronic anxiety, reports she can not take SSRI's ,,has tried several different kinds according to Pt. Lives alone, took in roommate for financial help, having difficulties with roommate, chronic allergies/ RAD. Sees Therapist weekly. Cramps in both legs, taking magnesium , constipation. taking Magnesium as she read up on line, advised Colonoscopy by myself. +++allergic mult medications++ C/O cough several weeks, takes Maintenance Inhaler daily.

## 2022-12-22 NOTE — ASSESSMENT
[FreeTextEntry1] : 62 yr old \par \par Asthma- Cough? Continue maintenance Inhaler \par Daily, Use MAYLIN every 8 hours x 3 days\par taper gradually \par \par Anxiety- Discussed risks/ benefits Long term\par Benzo usage, recommend SSRI trial again\par \par HTN- Needs slightly better control, low salt diet\par Increase HCTZ to 25mg po daily m electrolyte \par replacement needed 2-3 x weekly\par Reports Coconut water daily \par Home checking/ Logging\par \par Hyperlipidemia- Statin daily, low fats, low carbs\par Exercise, weight loss \par \par \par Labs done in office by MA\par \par Health Screenings reviewed/ Cologaurd ordered\par \par F/U 3-4 weeks BP Re-check/ BP Log \par

## 2022-12-22 NOTE — REVIEW OF SYSTEMS
[Negative] : Heme/Lymph [Anxiety] : anxiety [Hoarseness] : hoarseness [Nasal Discharge] : nasal discharge [Postnasal Drip] : postnasal drip [Cough] : cough [FreeTextEntry9] : both legs cramping

## 2022-12-22 NOTE — HEALTH RISK ASSESSMENT
[Excellent] : ~his/her~  mood as  excellent [Never] : Never [No] : In the past 12 months have you used drugs other than those required for medical reasons? No [No falls in past year] : Patient reported no falls in the past year [0] : 1) Little interest or pleasure doing things: Not at all (0) [1] : 2) Feeling down, depressed, or hopeless for several days (1) [HIV test declined] : HIV test declined [Hepatitis C test declined] : Hepatitis C test declined [Alone] : lives alone [Employed] : employed [Fully functional (bathing, dressing, toileting, transferring, walking, feeding)] : Fully functional (bathing, dressing, toileting, transferring, walking, feeding) [Fully functional (using the telephone, shopping, preparing meals, housekeeping, doing laundry, using] : Fully functional and needs no help or supervision to perform IADLs (using the telephone, shopping, preparing meals, housekeeping, doing laundry, using transportation, managing medications and managing finances) [Smoke Detector] : smoke detector [Carbon Monoxide Detector] : carbon monoxide detector [Seat Belt] :  uses seat belt [Sunscreen] : uses sunscreen [FreeTextEntry1] : congestion/cough,  [de-identified] : none [de-identified] : None [Audit-CScore] : 0 [de-identified] : walking, at home work out videos  [de-identified] : Regular [[id number]] : 1 [Change in mental status noted] : No change in mental status noted [Language] : denies difficulty with language [Behavior] : denies difficulty with behavior [Learning/Retaining New Information] : denies difficulty learning/retaining new information [Handling Complex Tasks] : denies difficulty handling complex tasks [Reasoning] : denies difficulty with reasoning [Spatial Ability and Orientation] : denies difficulty with spatial ability and orientation [None] : None [College] : College [Single] : single [Sexually Active] : not sexually active [High Risk Behavior] : no high risk behavior [Feels Safe at Home] : Feels safe at home [Reports changes in hearing] : Reports no changes in hearing [Reports changes in vision] : Reports no changes in vision [Reports normal functional visual acuity (ie: able to read med bottle)] : Reports poor functional visual acuity.  [Reports changes in dental health] : Reports no changes in dental health [Guns at Home] : no guns at home [Safety elements used in home] : safety elements used in home [Travel to Developing Areas] : does not  travel to developing areas [TB Exposure] : is not being exposed to tuberculosis [Caregiver Concerns] : does not have caregiver concerns [MammogramComments] : needs referral  [PapSmearComments] : needs updated  [BoneDensityComments] : needs  [ColonoscopyComments] : Never/ wants to do Cologaurd

## 2022-12-22 NOTE — PHYSICAL EXAM
[No Acute Distress] : no acute distress [Well Nourished] : well nourished [Well Developed] : well developed [Well-Appearing] : well-appearing [Normal Sclera/Conjunctiva] : normal sclera/conjunctiva [PERRL] : pupils equal round and reactive to light [EOMI] : extraocular movements intact [Normal Outer Ear/Nose] : the outer ears and nose were normal in appearance [Normal Oropharynx] : the oropharynx was normal [No JVD] : no jugular venous distention [No Lymphadenopathy] : no lymphadenopathy [Supple] : supple [Thyroid Normal, No Nodules] : the thyroid was normal and there were no nodules present [No Respiratory Distress] : no respiratory distress  [No Accessory Muscle Use] : no accessory muscle use [Clear to Auscultation] : lungs were clear to auscultation bilaterally [Normal Rate] : normal rate  [Regular Rhythm] : with a regular rhythm [Normal S1, S2] : normal S1 and S2 [No Murmur] : no murmur heard [No Carotid Bruits] : no carotid bruits [No Abdominal Bruit] : a ~M bruit was not heard ~T in the abdomen [No Varicosities] : no varicosities [Pedal Pulses Present] : the pedal pulses are present [No Edema] : there was no peripheral edema [No Palpable Aorta] : no palpable aorta [No Extremity Clubbing/Cyanosis] : no extremity clubbing/cyanosis [Soft] : abdomen soft [Non Tender] : non-tender [Non-distended] : non-distended [No HSM] : no HSM [Normal Bowel Sounds] : normal bowel sounds [Normal Posterior Cervical Nodes] : no posterior cervical lymphadenopathy [Normal Anterior Cervical Nodes] : no anterior cervical lymphadenopathy [No CVA Tenderness] : no CVA  tenderness [No Spinal Tenderness] : no spinal tenderness [No Joint Swelling] : no joint swelling [Grossly Normal Strength/Tone] : grossly normal strength/tone [No Rash] : no rash [Coordination Grossly Intact] : coordination grossly intact [No Focal Deficits] : no focal deficits [Normal Gait] : normal gait [Deep Tendon Reflexes (DTR)] : deep tendon reflexes were 2+ and symmetric [Normal Affect] : the affect was normal [Normal Insight/Judgement] : insight and judgment were intact [Normal Appearance] : normal in appearance [No Masses] : no palpable masses [No Nipple Discharge] : no nipple discharge [No Axillary Lymphadenopathy] : no axillary lymphadenopathy [Normal TMs] : both tympanic membranes were normal [Normal Nasal Mucosa] : the nasal mucosa was normal [de-identified] : overweight  [de-identified] : Crowded Oropharynx  [de-identified] : talkative

## 2022-12-29 ENCOUNTER — NON-APPOINTMENT (OUTPATIENT)
Age: 62
End: 2022-12-29

## 2022-12-29 LAB
25(OH)D3 SERPL-MCNC: 10 NG/ML
ALBUMIN SERPL ELPH-MCNC: 4.5 G/DL
ALP BLD-CCNC: 95 U/L
ALT SERPL-CCNC: 29 U/L
ANION GAP SERPL CALC-SCNC: 16 MMOL/L
AST SERPL-CCNC: 22 U/L
BASOPHILS # BLD AUTO: 0.07 K/UL
BASOPHILS NFR BLD AUTO: 0.8 %
BILIRUB SERPL-MCNC: 0.4 MG/DL
BUN SERPL-MCNC: 11 MG/DL
CALCIUM SERPL-MCNC: 9.9 MG/DL
CHLORIDE SERPL-SCNC: 106 MMOL/L
CHOLEST SERPL-MCNC: 188 MG/DL
CO2 SERPL-SCNC: 22 MMOL/L
CREAT SERPL-MCNC: 0.67 MG/DL
EGFR: 99 ML/MIN/1.73M2
EOSINOPHIL # BLD AUTO: 0.18 K/UL
EOSINOPHIL NFR BLD AUTO: 2.2 %
ESTIMATED AVERAGE GLUCOSE: 114 MG/DL
GLUCOSE SERPL-MCNC: 122 MG/DL
HBA1C MFR BLD HPLC: 5.6 %
HCT VFR BLD CALC: 42.9 %
HDLC SERPL-MCNC: 70 MG/DL
HGB BLD-MCNC: 14.1 G/DL
IMM GRANULOCYTES NFR BLD AUTO: 0.2 %
IRON SATN MFR SERPL: 22 %
IRON SERPL-MCNC: 71 UG/DL
LDLC SERPL CALC-MCNC: 108 MG/DL
LYMPHOCYTES # BLD AUTO: 2.28 K/UL
LYMPHOCYTES NFR BLD AUTO: 27.3 %
MAGNESIUM SERPL-MCNC: 2.1 MG/DL
MAN DIFF?: NORMAL
MCHC RBC-ENTMCNC: 30.1 PG
MCHC RBC-ENTMCNC: 32.9 GM/DL
MCV RBC AUTO: 91.7 FL
MONOCYTES # BLD AUTO: 0.75 K/UL
MONOCYTES NFR BLD AUTO: 9 %
NEUTROPHILS # BLD AUTO: 5.06 K/UL
NEUTROPHILS NFR BLD AUTO: 60.5 %
NONHDLC SERPL-MCNC: 118 MG/DL
PLATELET # BLD AUTO: 225 K/UL
POTASSIUM SERPL-SCNC: 3.3 MMOL/L
PROT SERPL-MCNC: 6.5 G/DL
RBC # BLD: 4.68 M/UL
RBC # FLD: 12.5 %
SODIUM SERPL-SCNC: 143 MMOL/L
TIBC SERPL-MCNC: 327 UG/DL
TRIGL SERPL-MCNC: 54 MG/DL
TSH SERPL-ACNC: 2.8 UIU/ML
UIBC SERPL-MCNC: 256 UG/DL
VIT B12 SERPL-MCNC: 315 PG/ML
WBC # FLD AUTO: 8.36 K/UL

## 2022-12-29 RX ORDER — ERGOCALCIFEROL 1.25 MG/1
1.25 MG CAPSULE ORAL
Qty: 12 | Refills: 2 | Status: ACTIVE | COMMUNITY
Start: 2022-12-29 | End: 1900-01-01

## 2022-12-30 ENCOUNTER — TRANSCRIPTION ENCOUNTER (OUTPATIENT)
Age: 62
End: 2022-12-30

## 2023-01-03 ENCOUNTER — TRANSCRIPTION ENCOUNTER (OUTPATIENT)
Age: 63
End: 2023-01-03

## 2023-01-04 ENCOUNTER — TRANSCRIPTION ENCOUNTER (OUTPATIENT)
Age: 63
End: 2023-01-04

## 2023-04-21 ENCOUNTER — APPOINTMENT (OUTPATIENT)
Dept: INTERNAL MEDICINE | Facility: CLINIC | Age: 63
End: 2023-04-21
Payer: COMMERCIAL

## 2023-04-21 VITALS
DIASTOLIC BLOOD PRESSURE: 85 MMHG | BODY MASS INDEX: 29.64 KG/M2 | RESPIRATION RATE: 16 BRPM | TEMPERATURE: 95 F | OXYGEN SATURATION: 98 % | HEART RATE: 87 BPM | HEIGHT: 60 IN | SYSTOLIC BLOOD PRESSURE: 149 MMHG | WEIGHT: 151 LBS

## 2023-04-21 DIAGNOSIS — R42 DIZZINESS AND GIDDINESS: ICD-10-CM

## 2023-04-21 DIAGNOSIS — J30.9 ALLERGIC RHINITIS, UNSPECIFIED: ICD-10-CM

## 2023-04-21 DIAGNOSIS — R05.9 COUGH, UNSPECIFIED: ICD-10-CM

## 2023-04-21 PROCEDURE — 36415 COLL VENOUS BLD VENIPUNCTURE: CPT

## 2023-04-21 PROCEDURE — 99214 OFFICE O/P EST MOD 30 MIN: CPT | Mod: 25

## 2023-04-24 NOTE — HEALTH RISK ASSESSMENT
[No] : In the past 12 months have you used drugs other than those required for medical reasons? No [No falls in past year] : Patient reported no falls in the past year [1] : 2) Feeling down, depressed, or hopeless for several days (1) [Former] : Former [> 15 Years] : > 15 Years [Intercurrent Urgi Care visits] : went to urgent care [de-identified] : URI [de-identified] : None [de-identified] : walking  [de-identified] : fair

## 2023-04-24 NOTE — PHYSICAL EXAM
[Normal] : affect was normal and insight and judgment were intact [de-identified] : Cough sounds dry ( Reports was much deeper previously)

## 2023-04-24 NOTE — HISTORY OF PRESENT ILLNESS
[FreeTextEntry1] : F/U mult medical conditions [de-identified] : 62 yr old presents reports recent visit to [facility] for  nasal congestion/ head congestion/ cough. Given antibiotic with Prednisone Burst ( 20mg po x 5 days) using Inhalers daily. Ongoing Sinus/ Allergies / Asthma . Feeling better overall, still coughing , but better than previously. Ongoing dizziness , takes Meclizine when needed .

## 2023-04-24 NOTE — ASSESSMENT
[FreeTextEntry1] : 62 yr old presents \par \par Allergic Rhinitis- Continue maintenance meds \par Discussed Singulair Ex; Use Springtime\par Consider ongoing if needed \par \par Cough- Finish Prednisone, Inhalers \par daily, Call if worsens \par \par HTN- Continue HCTZ daily\par Slightly higher today \par S/E Prednisone?\par Advised Home checking/ Logging \par \par Anxiety- Chronic , takes Benzo TID\par Discussed again recommendation SSRI\par Pt resistant to change \par \par \par Labs done in office by MA\par \par F/U 3-4 months

## 2023-04-25 ENCOUNTER — TRANSCRIPTION ENCOUNTER (OUTPATIENT)
Age: 63
End: 2023-04-25

## 2023-04-29 LAB
24R-OH-CALCIDIOL SERPL-MCNC: 77.3 PG/ML
ALBUMIN SERPL ELPH-MCNC: 4.5 G/DL
ALP BLD-CCNC: 88 U/L
ALT SERPL-CCNC: 41 U/L
ANION GAP SERPL CALC-SCNC: 17 MMOL/L
AST SERPL-CCNC: 27 U/L
BASOPHILS # BLD AUTO: 0.06 K/UL
BASOPHILS NFR BLD AUTO: 0.6 %
BILIRUB SERPL-MCNC: 0.3 MG/DL
BUN SERPL-MCNC: 11 MG/DL
CALCIUM SERPL-MCNC: 10.4 MG/DL
CHLORIDE SERPL-SCNC: 102 MMOL/L
CHOLEST SERPL-MCNC: 223 MG/DL
CO2 SERPL-SCNC: 20 MMOL/L
CREAT SERPL-MCNC: 0.62 MG/DL
EGFR: 101 ML/MIN/1.73M2
EOSINOPHIL # BLD AUTO: 0.1 K/UL
EOSINOPHIL NFR BLD AUTO: 1 %
GLUCOSE SERPL-MCNC: 152 MG/DL
HCT VFR BLD CALC: 44.5 %
HDLC SERPL-MCNC: 88 MG/DL
HGB BLD-MCNC: 14.4 G/DL
IMM GRANULOCYTES NFR BLD AUTO: 0.3 %
LDLC SERPL CALC-MCNC: 116 MG/DL
LYMPHOCYTES # BLD AUTO: 3.73 K/UL
LYMPHOCYTES NFR BLD AUTO: 36.7 %
MAN DIFF?: NORMAL
MCHC RBC-ENTMCNC: 29.7 PG
MCHC RBC-ENTMCNC: 32.4 GM/DL
MCV RBC AUTO: 91.8 FL
MONOCYTES # BLD AUTO: 0.7 K/UL
MONOCYTES NFR BLD AUTO: 6.9 %
NEUTROPHILS # BLD AUTO: 5.53 K/UL
NEUTROPHILS NFR BLD AUTO: 54.5 %
NONHDLC SERPL-MCNC: 135 MG/DL
PLATELET # BLD AUTO: 248 K/UL
POTASSIUM SERPL-SCNC: 3.9 MMOL/L
PROT SERPL-MCNC: 6.5 G/DL
RBC # BLD: 4.85 M/UL
RBC # FLD: 12.9 %
SODIUM SERPL-SCNC: 139 MMOL/L
TRIGL SERPL-MCNC: 95 MG/DL
WBC # FLD AUTO: 10.15 K/UL

## 2023-06-23 ENCOUNTER — RX RENEWAL (OUTPATIENT)
Age: 63
End: 2023-06-23

## 2023-06-23 RX ORDER — MONTELUKAST 10 MG/1
10 TABLET, FILM COATED ORAL DAILY
Qty: 90 | Refills: 1 | Status: ACTIVE | COMMUNITY
Start: 2023-04-21 | End: 1900-01-01

## 2023-08-09 ENCOUNTER — TRANSCRIPTION ENCOUNTER (OUTPATIENT)
Age: 63
End: 2023-08-09

## 2023-08-19 ENCOUNTER — APPOINTMENT (OUTPATIENT)
Dept: INTERNAL MEDICINE | Facility: CLINIC | Age: 63
End: 2023-08-19
Payer: COMMERCIAL

## 2023-08-19 VITALS
WEIGHT: 151 LBS | DIASTOLIC BLOOD PRESSURE: 92 MMHG | TEMPERATURE: 98.1 F | SYSTOLIC BLOOD PRESSURE: 164 MMHG | HEART RATE: 64 BPM | OXYGEN SATURATION: 98 % | BODY MASS INDEX: 29.64 KG/M2 | HEIGHT: 60 IN

## 2023-08-19 DIAGNOSIS — G47.00 INSOMNIA, UNSPECIFIED: ICD-10-CM

## 2023-08-19 DIAGNOSIS — E55.9 VITAMIN D DEFICIENCY, UNSPECIFIED: ICD-10-CM

## 2023-08-19 PROCEDURE — 99214 OFFICE O/P EST MOD 30 MIN: CPT | Mod: 25

## 2023-08-19 PROCEDURE — 36415 COLL VENOUS BLD VENIPUNCTURE: CPT

## 2023-08-19 NOTE — PHYSICAL EXAM
[Normal] : affect was normal and insight and judgment were intact [de-identified] : anxiety noted

## 2023-08-19 NOTE — HEALTH RISK ASSESSMENT
[No] : No [No falls in past year] : Patient reported no falls in the past year [0] : 2) Feeling down, depressed, or hopeless: Not at all (0) [Never] : Never [Intercurrent Urgi Care visits] : went to urgent care [de-identified] : Sinus congestion  [de-identified] : None [Audit-CScore] : 0 [de-identified] : walking [de-identified] : healthy [[id number]] : 0

## 2023-08-19 NOTE — ASSESSMENT
[FreeTextEntry1] : 62 yr old presents  HTN- Needs better control, low salt diet Anxiety? S/E prednisone? Home checking/ Logging Will re- discuss when call with labs  Anxiety- Discussed possible slight reduction in Klonopin ,,,will try to cut down half tablet daily F/U Counseling   Vit D - Recheck level today   Insomnia- Will Trial Melatonin 5mg po qHS If not helpful , go to 10mg po qHS    Labs done in office by MA Will call with results

## 2023-08-19 NOTE — HISTORY OF PRESENT ILLNESS
[FreeTextEntry1] : F/U HTN, Anxiety, Dizziness  [de-identified] : 62 yr old presents for above, complains of diff sleeping, works Evening hours , thus gets home 12 midnight, sleeps approx 4 hours daily. Will discuss possible slight reduction in Benzo . Sees Counselor weekly sessions for years. 2 days ago went to [facility] for Nasal congestion/, given Prednisone x 5 days, feels little better

## 2023-08-22 ENCOUNTER — NON-APPOINTMENT (OUTPATIENT)
Age: 63
End: 2023-08-22

## 2023-08-22 ENCOUNTER — TRANSCRIPTION ENCOUNTER (OUTPATIENT)
Age: 63
End: 2023-08-22

## 2023-08-22 LAB
24R-OH-CALCIDIOL SERPL-MCNC: 53.7 PG/ML
ALBUMIN SERPL ELPH-MCNC: 4.4 G/DL
ALP BLD-CCNC: 94 U/L
ALT SERPL-CCNC: 35 U/L
ANION GAP SERPL CALC-SCNC: 23 MMOL/L
AST SERPL-CCNC: 19 U/L
BILIRUB SERPL-MCNC: 0.2 MG/DL
BUN SERPL-MCNC: 12 MG/DL
CALCIUM SERPL-MCNC: 9.7 MG/DL
CHLORIDE SERPL-SCNC: 103 MMOL/L
CHOLEST SERPL-MCNC: 265 MG/DL
CO2 SERPL-SCNC: 16 MMOL/L
CREAT SERPL-MCNC: 0.67 MG/DL
EGFR: 99 ML/MIN/1.73M2
ESTIMATED AVERAGE GLUCOSE: 114 MG/DL
GLUCOSE SERPL-MCNC: 93 MG/DL
HBA1C MFR BLD HPLC: 5.6 %
HDLC SERPL-MCNC: 71 MG/DL
LDLC SERPL CALC-MCNC: 174 MG/DL
NONHDLC SERPL-MCNC: 194 MG/DL
POTASSIUM SERPL-SCNC: 3.7 MMOL/L
PROT SERPL-MCNC: 6.5 G/DL
SODIUM SERPL-SCNC: 142 MMOL/L
TRIGL SERPL-MCNC: 117 MG/DL

## 2023-08-27 ENCOUNTER — RX RENEWAL (OUTPATIENT)
Age: 63
End: 2023-08-27

## 2023-09-23 ENCOUNTER — APPOINTMENT (OUTPATIENT)
Dept: INTERNAL MEDICINE | Facility: CLINIC | Age: 63
End: 2023-09-23
Payer: COMMERCIAL

## 2023-09-23 DIAGNOSIS — R89.9 UNSPECIFIED ABNORMAL FINDING IN SPECIMENS FROM OTHER ORGANS, SYSTEMS AND TISSUES: ICD-10-CM

## 2023-09-23 PROCEDURE — 36415 COLL VENOUS BLD VENIPUNCTURE: CPT

## 2023-09-25 ENCOUNTER — TRANSCRIPTION ENCOUNTER (OUTPATIENT)
Age: 63
End: 2023-09-25

## 2023-09-26 ENCOUNTER — TRANSCRIPTION ENCOUNTER (OUTPATIENT)
Age: 63
End: 2023-09-26

## 2023-10-01 LAB
ALBUMIN SERPL ELPH-MCNC: 4.2 G/DL
ALP BLD-CCNC: 91 U/L
ALT SERPL-CCNC: 17 U/L
ANION GAP SERPL CALC-SCNC: 12 MMOL/L
AST SERPL-CCNC: 16 U/L
BILIRUB SERPL-MCNC: 0.2 MG/DL
BUN SERPL-MCNC: 12 MG/DL
CALCIUM SERPL-MCNC: 9.5 MG/DL
CHLORIDE SERPL-SCNC: 104 MMOL/L
CO2 SERPL-SCNC: 22 MMOL/L
CREAT SERPL-MCNC: 0.67 MG/DL
EGFR: 99 ML/MIN/1.73M2
GLUCOSE SERPL-MCNC: 99 MG/DL
POTASSIUM SERPL-SCNC: 3.9 MMOL/L
PROT SERPL-MCNC: 6.1 G/DL
SODIUM SERPL-SCNC: 138 MMOL/L

## 2023-10-16 ENCOUNTER — NON-APPOINTMENT (OUTPATIENT)
Age: 63
End: 2023-10-16

## 2023-10-31 RX ORDER — ATORVASTATIN CALCIUM 10 MG/1
10 TABLET, FILM COATED ORAL
Qty: 90 | Refills: 1 | Status: ACTIVE | COMMUNITY
Start: 2021-09-17 | End: 1900-01-01

## 2023-12-29 ENCOUNTER — NON-APPOINTMENT (OUTPATIENT)
Age: 63
End: 2023-12-29

## 2023-12-29 ENCOUNTER — TRANSCRIPTION ENCOUNTER (OUTPATIENT)
Age: 63
End: 2023-12-29

## 2023-12-29 DIAGNOSIS — Z12.39 ENCOUNTER FOR OTHER SCREENING FOR MALIGNANT NEOPLASM OF BREAST: ICD-10-CM

## 2024-01-06 ENCOUNTER — APPOINTMENT (OUTPATIENT)
Dept: CARE COORDINATION | Facility: HOME HEALTH | Age: 64
End: 2024-01-06

## 2024-01-09 ENCOUNTER — APPOINTMENT (OUTPATIENT)
Dept: INTERNAL MEDICINE | Facility: CLINIC | Age: 64
End: 2024-01-09
Payer: COMMERCIAL

## 2024-01-09 VITALS
OXYGEN SATURATION: 98 % | HEART RATE: 64 BPM | HEIGHT: 60 IN | DIASTOLIC BLOOD PRESSURE: 76 MMHG | TEMPERATURE: 98.1 F | SYSTOLIC BLOOD PRESSURE: 170 MMHG

## 2024-01-09 PROCEDURE — 99214 OFFICE O/P EST MOD 30 MIN: CPT | Mod: 25

## 2024-01-09 PROCEDURE — 36415 COLL VENOUS BLD VENIPUNCTURE: CPT

## 2024-01-10 ENCOUNTER — TRANSCRIPTION ENCOUNTER (OUTPATIENT)
Age: 64
End: 2024-01-10

## 2024-01-10 ENCOUNTER — NON-APPOINTMENT (OUTPATIENT)
Age: 64
End: 2024-01-10

## 2024-01-10 NOTE — HEALTH RISK ASSESSMENT
[No] : In the past 12 months have you used drugs other than those required for medical reasons? No [No falls in past year] : Patient reported no falls in the past year [Never] : Never [0] : 2) Feeling down, depressed, or hopeless: Not at all (0) [de-identified] : none [de-identified] : none [de-identified] : excercise [Audit-CScore] : 0 [de-identified] : healthy [[id number]] : 0

## 2024-01-10 NOTE — PLAN
[FreeTextEntry1] : 63 yr old F/U    HTN- ? White Coat Syndrome? Home checking/ Logging Call office with home readings   Anxiety- Continue Klonopin BID Possible reduce once daily in future?  Hyperlipidemia- Statin daily Low fat, low carbs , exercise   Labs done in office by MA Will communicate BP readings from home   later today

## 2024-01-10 NOTE — HISTORY OF PRESENT ILLNESS
[FreeTextEntry1] : F/U HTN, Anxiety, Hyperlipidemia [de-identified] : 63 yr old presents for above, feels well overall. Happy to announce weight loss , doing controlled meals ( Ordering and delivery) low salt, low carbs , Exercising regular basis. Reduced Klonopin to twice daily as discussed in past.

## 2024-01-13 ENCOUNTER — APPOINTMENT (OUTPATIENT)
Dept: CARE COORDINATION | Facility: HOME HEALTH | Age: 64
End: 2024-01-13
Payer: COMMERCIAL

## 2024-01-13 PROCEDURE — ZZZZZ: CPT

## 2024-01-13 PROCEDURE — 99213 OFFICE O/P EST LOW 20 MIN: CPT | Mod: 95

## 2024-01-13 NOTE — REVIEW OF SYSTEMS
[Negative] : Constitutional [FreeTextEntry7] : patient states she currently has the stomach bug or food poisoning has been up since 5am

## 2024-01-13 NOTE — HISTORY OF PRESENT ILLNESS
[Home] : at home, [unfilled] , at the time of the visit. [Other Location: e.g. Home (Enter Location, City,State)___] : at [unfilled] [Verbal consent obtained from patient] : the patient, [unfilled] [Hypertension] : Hypertension [FreeTextEntry6] : Patient being seen for HTN management patient states "it is always high in the doctors office"    Lifestyle Review Nutrition: Gets food out often, from refrigerated food store for healthy meals.  Restorative Sleep: 4-7 hours Physical Activity: Walks a lot at work, just started a home exercise program Stress Management: Lives with an alcoholic, so stress varies.  Social Connection: "I am the listener for everyone"  Avoidance of Risky Substances: None [Does not check BP] : The patient is not checking blood pressure

## 2024-01-13 NOTE — ASSESSMENT
[FreeTextEntry1] : Plan: Hypertension:  Start RPM. Adjust medications towards achievement of target BP <130/90. Patient oriented to RPM program including daily measurements, escalations, regular monitoring and touch-bases as well as possible need to titrate medications Patient has had appropriate screenings. (Lipids, CMP, UA, Optho)

## 2024-02-01 ENCOUNTER — APPOINTMENT (OUTPATIENT)
Dept: CARE COORDINATION | Facility: CLINIC | Age: 64
End: 2024-02-01
Payer: COMMERCIAL

## 2024-02-01 VITALS — DIASTOLIC BLOOD PRESSURE: 76 MMHG | HEART RATE: 69 BPM | SYSTOLIC BLOOD PRESSURE: 125 MMHG

## 2024-02-01 PROCEDURE — 99457 RPM TX MGMT 1ST 20 MIN: CPT

## 2024-02-09 ENCOUNTER — NON-APPOINTMENT (OUTPATIENT)
Age: 64
End: 2024-02-09

## 2024-02-12 NOTE — ASSESSMENT
[FreeTextEntry1] : See summary report dated 2/1/2024 for synchronous communication and billable hours report dated 2/1/2024 for total minutes.    Biofourmis data reviewed; patient at goal with interactive encounters and monitoring. Correct blood pressure cuff size is confirmed. Will continue to review medications and times, nutrition, sleep hygiene, physical activity, stress management, social connections, and avoidance of risky substances. Plan is to continue to monitor blood pressure and consider graduation from program if adequate BP control maintained.

## 2024-02-29 VITALS — SYSTOLIC BLOOD PRESSURE: 123 MMHG | HEART RATE: 67 BPM | DIASTOLIC BLOOD PRESSURE: 73 MMHG

## 2024-03-01 ENCOUNTER — APPOINTMENT (OUTPATIENT)
Dept: CARE COORDINATION | Facility: CLINIC | Age: 64
End: 2024-03-01
Payer: COMMERCIAL

## 2024-03-01 PROCEDURE — 99457 RPM TX MGMT 1ST 20 MIN: CPT

## 2024-03-01 PROCEDURE — 99458 RPM TX MGMT EA ADDL 20 MIN: CPT

## 2024-03-05 NOTE — ASSESSMENT
[FreeTextEntry1] : See summary report dated 3/1/2024 for synchronous communication and billable hours report dated 3/1/2024 for total minutes.     Biofourmis data reviewed; patient at goal with interactive encounters and monitoring. Plan is to graduate from program as adequate BP control maintained.

## 2024-03-11 ENCOUNTER — NON-APPOINTMENT (OUTPATIENT)
Age: 64
End: 2024-03-11

## 2024-04-10 ENCOUNTER — NON-APPOINTMENT (OUTPATIENT)
Age: 64
End: 2024-04-10

## 2024-04-11 RX ORDER — MECLIZINE HYDROCHLORIDE 25 MG/1
25 TABLET ORAL
Qty: 45 | Refills: 0 | Status: ACTIVE | COMMUNITY
Start: 2017-05-30 | End: 1900-01-01

## 2024-04-17 ENCOUNTER — TRANSCRIPTION ENCOUNTER (OUTPATIENT)
Age: 64
End: 2024-04-17

## 2024-04-19 ENCOUNTER — TRANSCRIPTION ENCOUNTER (OUTPATIENT)
Age: 64
End: 2024-04-19

## 2024-04-27 ENCOUNTER — APPOINTMENT (OUTPATIENT)
Dept: INTERNAL MEDICINE | Facility: CLINIC | Age: 64
End: 2024-04-27

## 2024-05-11 ENCOUNTER — APPOINTMENT (OUTPATIENT)
Dept: INTERNAL MEDICINE | Facility: CLINIC | Age: 64
End: 2024-05-11
Payer: COMMERCIAL

## 2024-05-11 ENCOUNTER — LABORATORY RESULT (OUTPATIENT)
Age: 64
End: 2024-05-11

## 2024-05-11 VITALS
SYSTOLIC BLOOD PRESSURE: 161 MMHG | HEART RATE: 62 BPM | HEIGHT: 60 IN | RESPIRATION RATE: 16 BRPM | BODY MASS INDEX: 26.7 KG/M2 | OXYGEN SATURATION: 98 % | TEMPERATURE: 96.3 F | DIASTOLIC BLOOD PRESSURE: 74 MMHG | WEIGHT: 136 LBS

## 2024-05-11 DIAGNOSIS — F41.9 ANXIETY DISORDER, UNSPECIFIED: ICD-10-CM

## 2024-05-11 DIAGNOSIS — J45.909 UNSPECIFIED ASTHMA, UNCOMPLICATED: ICD-10-CM

## 2024-05-11 DIAGNOSIS — Z00.00 ENCOUNTER FOR GENERAL ADULT MEDICAL EXAMINATION W/OUT ABNORMAL FINDINGS: ICD-10-CM

## 2024-05-11 DIAGNOSIS — I10 ESSENTIAL (PRIMARY) HYPERTENSION: ICD-10-CM

## 2024-05-11 DIAGNOSIS — E78.5 HYPERLIPIDEMIA, UNSPECIFIED: ICD-10-CM

## 2024-05-11 PROCEDURE — G2211 COMPLEX E/M VISIT ADD ON: CPT | Mod: NC,1L

## 2024-05-11 PROCEDURE — 36415 COLL VENOUS BLD VENIPUNCTURE: CPT

## 2024-05-11 PROCEDURE — 99396 PREV VISIT EST AGE 40-64: CPT | Mod: 25

## 2024-05-11 NOTE — PLAN
01-Apr-2017 17:00
[FreeTextEntry1] : 63 yr old CPE  HTN- ? Controlled, Continue healthydiet and exercise, Home checking/ Logging  Anxiety- In good control, Slowly weaning self down on Klonopin , Strongly advised Mental Health Counseling,,,Reluctant to take SSRI type medication  Hyperlipidemia- Check Lipids today Not taking Statin as suggested   Asthma- Controlled at this time Encouraged control of Seasonal Allergies Inhalers as directed   Labs done in office by MA Health Screenings reviewed   Will call with results  F/U Approx 1 week  Telephone Home BP Readings   F/U approx 4 months

## 2024-05-11 NOTE — HEALTH RISK ASSESSMENT
[Fair] : ~his/her~ current health as fair  [Good] : ~his/her~  mood as  good [Any fall with injury in past year] : Patient reported fall with injury in the past year [No] : In the past 12 months have you used drugs other than those required for medical reasons? No [0] : 1) Little interest or pleasure doing things: Not at all (0) [1] : 2) Feeling down, depressed, or hopeless for several days (1) [Employed] : employed [Fully functional (bathing, dressing, toileting, transferring, walking, feeding)] : Fully functional (bathing, dressing, toileting, transferring, walking, feeding) [Fully functional (using the telephone, shopping, preparing meals, housekeeping, doing laundry, using] : Fully functional and needs no help or supervision to perform IADLs (using the telephone, shopping, preparing meals, housekeeping, doing laundry, using transportation, managing medications and managing finances) [Smoke Detector] : smoke detector [Carbon Monoxide Detector] : carbon monoxide detector [Safety elements used in home] : safety elements used in home [Seat Belt] :  uses seat belt [Sunscreen] : uses sunscreen [None] : None [Alone] : lives alone [College] : College [Single] : single [Feels Safe at Home] : Feels safe at home [Reports normal functional visual acuity (ie: able to read med bottle)] : Reports normal functional visual acuity [de-identified] : none [de-identified] : None [Audit-CScore] : 0 [de-identified] : exercise [de-identified] : healthy [de-identified] : pt fell and hurt back [[id number]] : 1 [Change in mental status noted] : No change in mental status noted [Language] : denies difficulty with language [Handling Complex Tasks] : denies difficulty handling complex tasks [Sexually Active] : not sexually active [High Risk Behavior] : no high risk behavior [Reports changes in hearing] : Reports no changes in hearing [Reports changes in vision] : Reports no changes in vision [Reports changes in dental health] : Reports no changes in dental health [Guns at Home] : no guns at home [Travel to Developing Areas] : does not  travel to developing areas [TB Exposure] : is not being exposed to tuberculosis [Caregiver Concerns] : does not have caregiver concerns [MammogramDate] : 03/2024 [MammogramComments] : Good [PapSmearComments] : awhile [BoneDensityComments] : never done [ColonoscopyComments] : never done

## 2024-05-11 NOTE — HISTORY OF PRESENT ILLNESS
[FreeTextEntry1] : CPE [de-identified] : 63 yr old presents  for above, feels well overall. Happy to announce weight loss and exercising regular basis. Does report feeling very tired, worked last evening, slept only several hours. Not taking Statin medication as advised, anxious to see what Lipids are like this visit.

## 2024-05-11 NOTE — COUNSELING
[Behavioral health counseling provided] : Behavioral health counseling provided [Sleep ___ hours/day] : Sleep [unfilled] hours/day [Engage in a relaxing activity] : Engage in a relaxing activity [Plan in advance] : Plan in advance [None] : None [de-identified] : Continue healthy Lifestyle

## 2024-05-11 NOTE — PHYSICAL EXAM
[Normal Sclera/Conjunctiva] : normal sclera/conjunctiva [Normal Outer Ear/Nose] : the outer ears and nose were normal in appearance [Normal Oropharynx] : the oropharynx was normal [Normal TMs] : both tympanic membranes were normal [Normal Nasal Mucosa] : the nasal mucosa was normal [Normal] : normal gait, coordination grossly intact, no focal deficits and deep tendon reflexes were 2+ and symmetric [Speech Grossly Normal] : speech grossly normal [Memory Grossly Normal] : memory grossly normal [Normal Affect] : the affect was normal [Alert and Oriented x3] : oriented to person, place, and time [Normal Mood] : the mood was normal [Normal Insight/Judgement] : insight and judgment were intact [de-identified] : Mild anxiety noted

## 2024-05-13 ENCOUNTER — NON-APPOINTMENT (OUTPATIENT)
Age: 64
End: 2024-05-13

## 2024-05-20 LAB
25(OH)D3 SERPL-MCNC: 47.4 NG/ML
ALBUMIN SERPL ELPH-MCNC: 4.3 G/DL
ALP BLD-CCNC: 91 U/L
ALT SERPL-CCNC: 18 U/L
ANION GAP SERPL CALC-SCNC: 13 MMOL/L
AST SERPL-CCNC: 17 U/L
BASOPHILS # BLD AUTO: 0.07 K/UL
BASOPHILS NFR BLD AUTO: 0.8 %
BILIRUB SERPL-MCNC: 0.3 MG/DL
BUN SERPL-MCNC: 15 MG/DL
CALCIUM SERPL-MCNC: 9.6 MG/DL
CHLORIDE SERPL-SCNC: 99 MMOL/L
CHOLEST SERPL-MCNC: 288 MG/DL
CO2 SERPL-SCNC: 23 MMOL/L
CREAT SERPL-MCNC: 0.7 MG/DL
EGFR: 98 ML/MIN/1.73M2
EOSINOPHIL # BLD AUTO: 0.16 K/UL
EOSINOPHIL NFR BLD AUTO: 1.8 %
ESTIMATED AVERAGE GLUCOSE: 103 MG/DL
GLUCOSE SERPL-MCNC: 89 MG/DL
HBA1C MFR BLD HPLC: 5.2 %
HCT VFR BLD CALC: 39.8 %
HDLC SERPL-MCNC: 70 MG/DL
HGB BLD-MCNC: 13.4 G/DL
IMM GRANULOCYTES NFR BLD AUTO: 0.2 %
IRON SATN MFR SERPL: 23 %
IRON SERPL-MCNC: 60 UG/DL
LDLC SERPL CALC-MCNC: 204 MG/DL
LYMPHOCYTES # BLD AUTO: 3.07 K/UL
LYMPHOCYTES NFR BLD AUTO: 34.7 %
MAN DIFF?: NORMAL
MCHC RBC-ENTMCNC: 29.1 PG
MCHC RBC-ENTMCNC: 33.7 GM/DL
MCV RBC AUTO: 86.3 FL
MONOCYTES # BLD AUTO: 0.64 K/UL
MONOCYTES NFR BLD AUTO: 7.2 %
NEUTROPHILS # BLD AUTO: 4.88 K/UL
NEUTROPHILS NFR BLD AUTO: 55.3 %
NONHDLC SERPL-MCNC: 218 MG/DL
PLATELET # BLD AUTO: 225 K/UL
POTASSIUM SERPL-SCNC: 3.6 MMOL/L
PROT SERPL-MCNC: 6.5 G/DL
RBC # BLD: 4.61 M/UL
RBC # FLD: 12.5 %
SODIUM SERPL-SCNC: 135 MMOL/L
TIBC SERPL-MCNC: 266 UG/DL
TRIGL SERPL-MCNC: 89 MG/DL
TSH SERPL-ACNC: 5.04 UIU/ML
UIBC SERPL-MCNC: 206 UG/DL
VIT B12 SERPL-MCNC: 764 PG/ML
WBC # FLD AUTO: 8.84 K/UL

## 2024-05-20 RX ORDER — FLUTICASONE PROPIONATE AND SALMETEROL 250; 50 UG/1; UG/1
250-50 POWDER RESPIRATORY (INHALATION)
Qty: 3 | Refills: 1 | Status: ACTIVE | COMMUNITY
Start: 2020-02-17 | End: 1900-01-01

## 2024-05-20 RX ORDER — HYDROCHLOROTHIAZIDE 12.5 MG/1
12.5 CAPSULE ORAL
Qty: 90 | Refills: 1 | Status: ACTIVE | COMMUNITY
Start: 2018-01-20 | End: 1900-01-01

## 2024-06-11 ENCOUNTER — NON-APPOINTMENT (OUTPATIENT)
Age: 64
End: 2024-06-11

## 2024-06-11 RX ORDER — CLONAZEPAM 0.5 MG/1
0.5 TABLET ORAL TWICE DAILY
Qty: 60 | Refills: 0 | Status: ACTIVE | COMMUNITY
Start: 1900-01-01 | End: 1900-01-01

## 2024-07-10 ENCOUNTER — NON-APPOINTMENT (OUTPATIENT)
Age: 64
End: 2024-07-10

## 2024-08-09 ENCOUNTER — NON-APPOINTMENT (OUTPATIENT)
Age: 64
End: 2024-08-09

## 2024-08-17 ENCOUNTER — APPOINTMENT (OUTPATIENT)
Dept: INTERNAL MEDICINE | Facility: CLINIC | Age: 64
End: 2024-08-17

## 2024-08-19 ENCOUNTER — RX RENEWAL (OUTPATIENT)
Age: 64
End: 2024-08-19

## 2024-09-07 ENCOUNTER — APPOINTMENT (OUTPATIENT)
Dept: INTERNAL MEDICINE | Facility: CLINIC | Age: 64
End: 2024-09-07
Payer: COMMERCIAL

## 2024-09-07 VITALS
SYSTOLIC BLOOD PRESSURE: 160 MMHG | OXYGEN SATURATION: 99 % | WEIGHT: 124 LBS | HEIGHT: 60 IN | HEART RATE: 61 BPM | BODY MASS INDEX: 24.35 KG/M2 | DIASTOLIC BLOOD PRESSURE: 85 MMHG | TEMPERATURE: 97.7 F | RESPIRATION RATE: 16 BRPM

## 2024-09-07 DIAGNOSIS — R89.9 UNSPECIFIED ABNORMAL FINDING IN SPECIMENS FROM OTHER ORGANS, SYSTEMS AND TISSUES: ICD-10-CM

## 2024-09-07 DIAGNOSIS — F41.9 ANXIETY DISORDER, UNSPECIFIED: ICD-10-CM

## 2024-09-07 DIAGNOSIS — I10 ESSENTIAL (PRIMARY) HYPERTENSION: ICD-10-CM

## 2024-09-07 DIAGNOSIS — E78.5 HYPERLIPIDEMIA, UNSPECIFIED: ICD-10-CM

## 2024-09-07 PROCEDURE — 99214 OFFICE O/P EST MOD 30 MIN: CPT

## 2024-09-07 PROCEDURE — 36415 COLL VENOUS BLD VENIPUNCTURE: CPT

## 2024-09-07 NOTE — PHYSICAL EXAM
[No Acute Distress] : no acute distress [Normal Sclera/Conjunctiva] : normal sclera/conjunctiva [Normal Oropharynx] : the oropharynx was normal [No JVD] : no jugular venous distention [No Lymphadenopathy] : no lymphadenopathy [No Respiratory Distress] : no respiratory distress  [No Accessory Muscle Use] : no accessory muscle use [Clear to Auscultation] : lungs were clear to auscultation bilaterally [Normal Rate] : normal rate  [Regular Rhythm] : with a regular rhythm [No Edema] : there was no peripheral edema [No CVA Tenderness] : no CVA  tenderness [No Joint Swelling] : no joint swelling [No Rash] : no rash [Coordination Grossly Intact] : coordination grossly intact [No Focal Deficits] : no focal deficits [Normal Gait] : normal gait [Speech Grossly Normal] : speech grossly normal [Memory Grossly Normal] : memory grossly normal [Normal Affect] : the affect was normal [Alert and Oriented x3] : oriented to person, place, and time [Normal Mood] : the mood was normal [Normal Insight/Judgement] : insight and judgment were intact

## 2024-09-07 NOTE — HISTORY OF PRESENT ILLNESS
[FreeTextEntry1] : F/U Mult medical conditions [de-identified] : 63 yr old F/U , feels well overall, keeping weight off with diet and exercise, not taking Statin as advised, will think about it. BP gets high when comes into office, will check at home/ log. C/O Ongoing head congestion/ Ears clogged , has Seasonal Allergies/ Sinus problems./ RAD, stopped taking Singulair?? Was not covered by Insurance ? Will send in order again

## 2024-09-07 NOTE — REVIEW OF SYSTEMS
[Dizziness] : dizziness [Anxiety] : anxiety [Negative] : Heme/Lymph [Suicidal] : not suicidal [Insomnia] : no insomnia [Depression] : no depression [de-identified] : CComes and goes

## 2024-09-07 NOTE — HEALTH RISK ASSESSMENT
[Never (0 pts)] : Never (0 points) [No] : In the past 12 months have you used drugs other than those required for medical reasons? No [No falls in past year] : Patient reported no falls in the past year [0] : 2) Feeling down, depressed, or hopeless: Not at all (0) [de-identified] : no [de-identified] : no [Audit-CScore] : 0 [de-identified] : active  [de-identified] : healthy [[id number]] : 0

## 2024-09-07 NOTE — PLAN
[FreeTextEntry1] : 63 yr old F/U  HTN- ? Controlled, reports home BP Readings much better, continue low salt diet Exercise  Vertigo- Takes meclizine on occasion, has gotten better than before  Anxiety- Takes Klonopin 2x daily ( Reduced from 3x daily) Encouraged to reduce more if possible F/u Counseling   Hyperlipidemia- Will re- check Lipids  seems Hereditary in nature Will discuss Statin usage again after  results are back  Elevated TSH- Repeat today    Labs done in office by MA

## 2024-09-08 LAB
CHOLEST SERPL-MCNC: 269 MG/DL
HDLC SERPL-MCNC: 67 MG/DL
LDLC SERPL CALC-MCNC: 192 MG/DL
NONHDLC SERPL-MCNC: 202 MG/DL
TRIGL SERPL-MCNC: 65 MG/DL
TSH SERPL-ACNC: 1.46 UIU/ML

## 2024-09-09 ENCOUNTER — NON-APPOINTMENT (OUTPATIENT)
Age: 64
End: 2024-09-09

## 2024-09-09 ENCOUNTER — TRANSCRIPTION ENCOUNTER (OUTPATIENT)
Age: 64
End: 2024-09-09

## 2024-09-10 ENCOUNTER — TRANSCRIPTION ENCOUNTER (OUTPATIENT)
Age: 64
End: 2024-09-10

## 2024-10-17 ENCOUNTER — NON-APPOINTMENT (OUTPATIENT)
Age: 64
End: 2024-10-17

## 2024-11-25 ENCOUNTER — NON-APPOINTMENT (OUTPATIENT)
Age: 64
End: 2024-11-25

## 2025-01-02 ENCOUNTER — NON-APPOINTMENT (OUTPATIENT)
Age: 65
End: 2025-01-02

## 2025-01-18 ENCOUNTER — APPOINTMENT (OUTPATIENT)
Dept: INTERNAL MEDICINE | Facility: CLINIC | Age: 65
End: 2025-01-18

## 2025-01-18 VITALS
BODY MASS INDEX: 23.16 KG/M2 | HEART RATE: 72 BPM | HEIGHT: 60 IN | RESPIRATION RATE: 16 BRPM | SYSTOLIC BLOOD PRESSURE: 139 MMHG | OXYGEN SATURATION: 100 % | WEIGHT: 118 LBS | TEMPERATURE: 97.9 F | DIASTOLIC BLOOD PRESSURE: 72 MMHG

## 2025-01-18 DIAGNOSIS — I10 ESSENTIAL (PRIMARY) HYPERTENSION: ICD-10-CM

## 2025-01-18 DIAGNOSIS — H81.10 BENIGN PAROXYSMAL VERTIGO, UNSPECIFIED EAR: ICD-10-CM

## 2025-01-18 DIAGNOSIS — E78.5 HYPERLIPIDEMIA, UNSPECIFIED: ICD-10-CM

## 2025-01-18 DIAGNOSIS — F41.9 ANXIETY DISORDER, UNSPECIFIED: ICD-10-CM

## 2025-01-18 PROCEDURE — 99214 OFFICE O/P EST MOD 30 MIN: CPT

## 2025-02-06 ENCOUNTER — NON-APPOINTMENT (OUTPATIENT)
Age: 65
End: 2025-02-06

## 2025-03-10 ENCOUNTER — NON-APPOINTMENT (OUTPATIENT)
Age: 65
End: 2025-03-10

## 2025-04-08 ENCOUNTER — NON-APPOINTMENT (OUTPATIENT)
Age: 65
End: 2025-04-08

## 2025-04-25 ENCOUNTER — NON-APPOINTMENT (OUTPATIENT)
Age: 65
End: 2025-04-25

## 2025-04-26 ENCOUNTER — APPOINTMENT (OUTPATIENT)
Dept: INTERNAL MEDICINE | Facility: CLINIC | Age: 65
End: 2025-04-26
Payer: COMMERCIAL

## 2025-04-26 VITALS
TEMPERATURE: 97.3 F | HEART RATE: 68 BPM | DIASTOLIC BLOOD PRESSURE: 76 MMHG | WEIGHT: 117 LBS | OXYGEN SATURATION: 100 % | SYSTOLIC BLOOD PRESSURE: 160 MMHG | BODY MASS INDEX: 22.97 KG/M2 | RESPIRATION RATE: 16 BRPM | HEIGHT: 60 IN

## 2025-04-26 DIAGNOSIS — F41.9 ANXIETY DISORDER, UNSPECIFIED: ICD-10-CM

## 2025-04-26 DIAGNOSIS — E78.5 HYPERLIPIDEMIA, UNSPECIFIED: ICD-10-CM

## 2025-04-26 DIAGNOSIS — I10 ESSENTIAL (PRIMARY) HYPERTENSION: ICD-10-CM

## 2025-04-26 DIAGNOSIS — J45.909 UNSPECIFIED ASTHMA, UNCOMPLICATED: ICD-10-CM

## 2025-04-26 DIAGNOSIS — L29.9 PRURITUS, UNSPECIFIED: ICD-10-CM

## 2025-04-26 LAB
ALBUMIN SERPL ELPH-MCNC: 4.5 G/DL
ALP BLD-CCNC: 85 U/L
ALT SERPL-CCNC: 15 U/L
ANION GAP SERPL CALC-SCNC: 11 MMOL/L
AST SERPL-CCNC: 15 U/L
BASOPHILS # BLD AUTO: 0.08 K/UL
BASOPHILS NFR BLD AUTO: 1.2 %
BILIRUB SERPL-MCNC: 0.3 MG/DL
BUN SERPL-MCNC: 12 MG/DL
CALCIUM SERPL-MCNC: 9.8 MG/DL
CHLORIDE SERPL-SCNC: 99 MMOL/L
CHOLEST SERPL-MCNC: 349 MG/DL
CO2 SERPL-SCNC: 24 MMOL/L
CREAT SERPL-MCNC: 0.59 MG/DL
EGFRCR SERPLBLD CKD-EPI 2021: 101 ML/MIN/1.73M2
EOSINOPHIL # BLD AUTO: 0.11 K/UL
EOSINOPHIL NFR BLD AUTO: 1.7 %
GLUCOSE SERPL-MCNC: 95 MG/DL
HCT VFR BLD CALC: 41.6 %
HDLC SERPL-MCNC: 96 MG/DL
HGB BLD-MCNC: 13.8 G/DL
IMM GRANULOCYTES NFR BLD AUTO: 0.3 %
LDLC SERPL-MCNC: 246 MG/DL
LYMPHOCYTES # BLD AUTO: 2.55 K/UL
LYMPHOCYTES NFR BLD AUTO: 38.4 %
MAN DIFF?: NORMAL
MCHC RBC-ENTMCNC: 29.6 PG
MCHC RBC-ENTMCNC: 33.2 G/DL
MCV RBC AUTO: 89.3 FL
MONOCYTES # BLD AUTO: 0.57 K/UL
MONOCYTES NFR BLD AUTO: 8.6 %
NEUTROPHILS # BLD AUTO: 3.31 K/UL
NEUTROPHILS NFR BLD AUTO: 49.8 %
NONHDLC SERPL-MCNC: 253 MG/DL
PLATELET # BLD AUTO: 285 K/UL
POTASSIUM SERPL-SCNC: 3.9 MMOL/L
PROT SERPL-MCNC: 6.7 G/DL
RBC # BLD: 4.66 M/UL
RBC # FLD: 12.4 %
SODIUM SERPL-SCNC: 134 MMOL/L
TRIGL SERPL-MCNC: 58 MG/DL
WBC # FLD AUTO: 6.64 K/UL

## 2025-04-26 PROCEDURE — 99214 OFFICE O/P EST MOD 30 MIN: CPT

## 2025-04-26 RX ORDER — MOMETASONE FUROATE 1 MG/G
0.1 OINTMENT TOPICAL DAILY
Qty: 1 | Refills: 3 | Status: ACTIVE | COMMUNITY
Start: 2025-04-26 | End: 1900-01-01

## 2025-05-09 ENCOUNTER — TRANSCRIPTION ENCOUNTER (OUTPATIENT)
Age: 65
End: 2025-05-09

## 2025-05-13 ENCOUNTER — NON-APPOINTMENT (OUTPATIENT)
Age: 65
End: 2025-05-13

## 2025-05-27 ENCOUNTER — TRANSCRIPTION ENCOUNTER (OUTPATIENT)
Age: 65
End: 2025-05-27

## 2025-06-18 ENCOUNTER — NON-APPOINTMENT (OUTPATIENT)
Age: 65
End: 2025-06-18

## 2025-08-01 ENCOUNTER — NON-APPOINTMENT (OUTPATIENT)
Age: 65
End: 2025-08-01

## 2025-08-12 ENCOUNTER — RX RENEWAL (OUTPATIENT)
Age: 65
End: 2025-08-12

## 2025-08-25 ENCOUNTER — RX RENEWAL (OUTPATIENT)
Age: 65
End: 2025-08-25

## 2025-09-05 ENCOUNTER — NON-APPOINTMENT (OUTPATIENT)
Age: 65
End: 2025-09-05